# Patient Record
Sex: FEMALE | Race: BLACK OR AFRICAN AMERICAN
[De-identification: names, ages, dates, MRNs, and addresses within clinical notes are randomized per-mention and may not be internally consistent; named-entity substitution may affect disease eponyms.]

---

## 2017-04-17 NOTE — PDOC
History of Present Illness





- General


History Source: Patient


Exam Limitations: No Limitations





- History of Present Illness


Initial Comments: 


04/17/17 08:09


The patient is a 46-year-old woman, with a significant past medical history of 

gastroesophageal reflux disease who presents to the emergency department via 

walk-in for further evaluation of abdominal pain since 01:30 AM this morning. 

No fall, trauma, strenuous activity. Her last meal was at approximately 20:30 

last night. She reports experiencing sharp right upper quadrant pain that woke 

her up from her sleep. She states that her pain has been constant since onset 

and within  the past few hours, she notes that her pain has wrapped around her 

right flank. She states that her pain is exacerbated when taking deep breathes. 

She reports associated symptoms of nausea and vomiting (x1; non-bloody).  She 

does not provide any alleviating factors. She also notes that she has 

experienced urinary frequency over the past day with dark appearing urine. No 

dysuria. No fever, chills, cough, shortness of breath, chest pain, headaches, 

leg pain/swelling. 





Allergies: None Known


Past Surgical History: Caesarian Sections


Social History: Occasional tobacco smoker. Occasional ETOH use. No recreational 

drug use.


Primary Care Physician: N/A recently moved from [place], [place] (in transition of 

finding a new PCP).





<Krista Zamarripa - Last Filed: 04/17/17 11:54>





<Gianni Andre - Last Filed: 04/17/17 12:35>





- General


Chief Complaint: Pain


Stated Complaint: DIFFICULTY BREATHING, ABD PAIN


Time Seen by Provider: 04/17/17 07:41





Past History





<Krista Zamarripa - Last Filed: 04/17/17 11:54>





- Psycho/Social/Smoking Cessation Hx


Suicidal Ideation: No


Smoking History: Never smoked


Information on smoking cessation initiated: No





<Gianni Andre - Last Filed: 04/17/17 12:35>





- Past Medical History


Allergies/Adverse Reactions: 


 Allergies











Allergy/AdvReac Type Severity Reaction Status Date / Time


 


No Known Allergies Allergy   Verified 04/17/17 07:14











Home Medications: 


Ambulatory Orders





Sulfamethoxazole/Trimethoprim [Bactrim Ds -] 1 tab PO BID #14 tablet 04/17/17 











**Review of Systems





- Review of Systems


Constitutional: No: Chills, Fever


Respiratory: No: Cough, Shortness of Breath


ABD/GI: Yes: Nausea, Vomiting.  No: Constipated, Diarrhea


: Yes: Frequency.  No: Dysuria, Hematuria


All Other Systems: Reviewed and Negative





<Gianni Andre - Last Filed: 04/17/17 12:35>





*Physical Exam





- Vital Signs


 Last Vital Signs











Temp Pulse Resp BP Pulse Ox


 


 98.3 F   94 H  18   140/85   98 


 


 04/17/17 07:11  04/17/17 07:11  04/17/17 07:11  04/17/17 07:11  04/17/17 07:38














- Physical Exam


Comments: 


04/17/17 08:10


GENERAL: The patient is awake, alert, and fully oriented, in no acute distress.


HEAD: Normal with no signs of trauma.


EYES: Pupils equal, round and reactive to light, extraocular movements intact, 

sclera 


anicteric, conjunctiva clear with no pallor.


ENT: Ears normal, nares patent, oropharynx clear without exudates.  Moist 

mucous membranes.


NECK: Normal range of motion, supple without lymphadenopathy, JVD, or masses.


LUNGS: Breath sounds equal, clear to auscultation bilaterally.  No wheeze/

crackles.


HEART: Regular rate and rhythm, normal S1 and S2 without murmur or rub.


ABDOMEN: Soft. There is some right upper quadrant tenderness to palpation with 

guarding. +Gallatin sign. +Right CVA tenderness. Nondistended. BS wnl.  No 

rebound.  No palpable masses. No hepatosplenomegaly.


 EXTREMITIES: Normal range of motion, no edema.  No clubbing or cyanosis. No 

cords, erythema, or tenderness.


NEUROLOGICAL: Cranial nerves II through XII grossly intact.  Normal speech.


PSYCH: Normal mood, normal affect.


SKIN: Warm, Dry, normal turgor, no rashes or lesions noted.





<Krista Zamarripa - Last Filed: 04/17/17 11:54>





- Vital Signs


 Last Vital Signs











Temp Pulse Resp BP Pulse Ox


 


 98.3 F   94 H  18   140/85   98 


 


 04/17/17 07:11  04/17/17 07:11  04/17/17 07:11  04/17/17 07:11  04/17/17 07:38














<Gianni Andre - Last Filed: 04/17/17 12:35>





**Heart Score/ECG Review


  ** #1


ECG reviewed & interpreted by me at: 08:19


General ECG Interpretation: Sinus Rhythm, Normal Rate (72), Normal Intervals (

qtc 473), No acute ischemic changes (T wave flattening V3-V5, no ST changes)





<Gianni Andre - Last Filed: 04/17/17 12:35>





ED Treatment Course





- LABORATORY


CBC & Chemistry Diagram: 


 04/17/17 07:59





 04/17/17 07:59





- RADIOLOGY


Radiograph Interpretation: 


04/17/17 10:13


EXAM: US/ABDOMEN US -LIMITED


IMPRESSION: The liver measures 18.1 cm in craniocaudal length with a slightly 

coarse echotexture. The gallbladder is adequately distended without 

intraluminal stones or thickening of its wall. No intra or extrahepatic bile 

duct dilatation is seen. The right and left kidney measured 10 and 10.2 cm , 

respectively. Small right renal upper pole cyst measuring 7 x 6 mm with a small 

upper pole nonobstructing stone measuring 5 mm. There are also a couple of 

nonobstructing left renal stones with the largest measuring 10 mm. The spleen 

measures 9.7 cm in sagittal length with homogeneous echotexture. Visualized 

portion of the pancreas appears unremarkable. There is a trace of free fluid in 

Castellanos's pouch. Visualized portion of the proximal abdominal aorta and 

inferior vena cava are patent. Normal flow in the main portal vein.





EXAM: CT/ABDOMEN PELVIS CT W/O CONTR 


IMPRESSION: Comparison: Ultrasound earlier today. Contiguous transaxial images 

were obtained from the diaphragmatic domes and pubic symphysis without the 

administration of oral and IV contrast as a Stone study. Lung bases: Negative. 

Bone: Negative. Liver: Negative. Gallbladder: Negative. Biliary tree: Negative. 

Spleen: Negative. Pancreas: Negative. Adrenals: Negative. Kidneys: Left kidney 

shows a small stone in the lower pole and a developing staghorn calculus in the 

mid to upper pole. The largest component of the staghorn calculus measures 20 

mm in AP dimension x 9 mm in width. The right kidney shows no stones. No 

obvious hydronephrosis is seen. Mild proximal right periureteral stranding 

which could be related to inflammatory disease or previous stone disease. I do 

not see a calcified stone at this time. Pelvis: Negative. Small calcifications 

in the left pelvis could represent phleboliths and/or uterine calcification. No 

calcifications in the right hemipelvis are seen. Small right adnexal cyst. The 

bladder is incompletely distended and compressed by the uterus but no stones 

are seen. Bowel: Negative. Other: There is a small hiatal hernia. 





<ZamarripaKrista - Last Filed: 04/17/17 11:54>





- LABORATORY


CBC & Chemistry Diagram: 


 04/17/17 07:59





 04/17/17 07:59





- RADIOLOGY


Radiology Studies Ordered: 














 Category Date Time Status


 


 GALLBLADDER US [US] Stat Ultrasound  04/17/17 08:03 Ordered














<Gianni Andre - Last Filed: 04/17/17 12:35>





Medical Decision Making





- Medical Decision Making


04/17/17 08:05


A portion of this note was documented by scribe services under my direction. I 

have reviewed the details of the note, within reason, and agree with the 

documentation with the following case summary and management plan written by me.





46-year-old female with no significant past medical history presents with right 

upper quadrant pain associated with nausea/vomiting since 1:30 AM. Patient was 

in her usual state of normal health, last meal was around 8 PM last night, was 

awoken from sleep at 1:30 with sharp right upper quadrant pain radiating around 

to her right back, persistent. No fevers or chills, nonbloody emesis, no 

history of recurrent postprandial pain. Does have GERD, but this is different.


Some urinary frequency but no gross hematuria.





Only surgical history was C-sections.





Afebrile.


Tender with guarding in the right upper quadrant, also has right CVA tenderness





46-year-old female with acute onset right upper quadrant pain with nausea/

vomiting, peritoneal findings at this time in the right upper quadrant. 

Presentation seems most consistent with biliary colic, possibly renal colic. 

Low suspicion for cardiopulmonary etiology. 


labs, ua, urine preg


ekg


RUQ sono


pain and nausea control


reassess





04/17/17 09:28


White count 11.1 with normal differential, hemoglobin 9.9 without prior for 

comparison.


Chemistries are within normal limits, including LFTs and lipase.


Urinalysis notable for elevated red blood cells at 72, 1+ leuk esterase.


Labs seem more consistent with renal colic, will check noncontrast CT for size 

and location given new onset.





04/17/17 12:28


CTAP shows renal stones but no evidence of ureteral stone or obstruction. 

Slight stranding around the right kidney, question recently passed stone versus 

infection given the urinalysis. Patient is completely comfortable now, her exam 

is normal. We'll treat empirically for UTI, question in the setting of recently 

passed stone but without any evidence of obstruction. Urine culture sent, will 

give dose of ceftriaxone intravenously in the ED, then discharge on Bactrim 

with urology follow-up. Understands return criteria.





<Gianni Andre - Last Filed: 04/17/17 12:35>





*DC/Admit/Observation/Transfer





- Attestations


Scribe Attestion: 


04/17/17 08:11


Documentation prepared by Krista Zamarripa, acting as medical scribe for 

Gianni Andre MD.





<Krista Zamarripa - Last Filed: 04/17/17 11:54>





<Gianni Andre - Last Filed: 04/17/17 12:35>


Diagnosis at time of Disposition: 


 Right upper quadrant abdominal pain, Kidney stone





- Discharge Dispostion


Disposition: HOME


Condition at time of disposition: Improved





- Prescriptions


Prescriptions: 


Sulfamethoxazole/Trimethoprim [Bactrim Ds -] 1 tab PO BID #14 tablet





- Referrals


Referrals: 


Quan Dacosta MD [Staff Physician] - 


Nathan Mathis MD [Staff Physician] - 





- Patient Instructions


Printed Discharge Instructions:  DI for Urinary Tract Infection (UTI), DI for 

Kidney Stones


Additional Instructions: 


Activity as tolerated. Stay hydrated. Tylenol 1000 mg every 8 hours and/or 

ibuprofen 600 mg every 8 hours as needed for pain. 





As discussed, blood tests, a urine test, an ultrasound, and a CAT scan of your 

abdomen and pelvis showed no current kidney stones, but there are stones in the 

kidneys. Your gallbladder is normal. 


I suspect you either passed a stone or have a urine infection. Take Bactrim as 

prescribed for 7 days. The urine culture results should be available in 3-4 

days. 





Continue your medications as previously prescribed by your physician. 





You should follow up with a primary doctor and a urologist as soon as possible 

regarding today's emergency department visit. Consider calling Dr. Mathis 

for an appointment with a primary physician, and Dr. Dacosta for an appointment 

with a urologist. 





Return to the emergency department for any new or concerning symptoms, 

particularly persistent or worsening pain, fevers or chills, difficulty 

urinating, vomiting.

## 2017-04-17 NOTE — EKG
Test Reason : 

Blood Pressure : ***/*** mmHG

Vent. Rate : 072 BPM     Atrial Rate : 072 BPM

   P-R Int : 150 ms          QRS Dur : 088 ms

    QT Int : 432 ms       P-R-T Axes : 067 021 002 degrees

   QTc Int : 473 ms

 

NORMAL SINUS RHYTHM

NONSPECIFIC T WAVE ABNORMALITY

PROLONGED QT

ABNORMAL ECG

NO PREVIOUS ECGS AVAILABLE

Confirmed by ALESSIO HACKETT MD ([id number]) on 4/17/2017 3:06:40 PM

 

Referred By:             Confirmed By:ALESSIO HACKETT MD

## 2017-07-14 NOTE — OP
Operative Note





- Note:


Operative Date: 07/14/17


Pre-Operative Diagnosis: L renal calculus


Operation: L ureteropyeloscopic laser lithotripsy and L JJ stent insertion


Findings: 


L renal calculus


Post-Operative Diagnosis: Same as Pre-op


Surgeon: Quan Dacosta


Anesthesiologist/CRNA: Veronica Elizondo


Anesthesia: General


Drains & Tubes with Location: 6 fr 24 cm L JJ stent


Operative Report Dictated: Yes

## 2017-07-19 NOTE — PDOC
History of Present Illness





- History of Present Illness


Initial Comments: 


07/19/17 22:45


Patient is a 47 year old female with significant medical hx of nephrolithiasis 

who is presenting to the ED with fevers, left flank pain and suprapubic pain 

for two days. The patient underwent lithotripsy with ureteral stent placement 

on 07/14/17 by Dr. Dacosta for kidney stones. Since her procedure the patient has 

experienced fevers and pain to her left side, left flank, and suprapubic 

region. Patient also endorses some nausea but denies any vomiting or diarrhea. 

Denies hematuria.


Surgeon: Quan Dacosta MD








<Cristal De Oliveira - Last Filed: 07/20/17 00:48>





<Nnamdi Delaney - Last Filed: 07/20/17 01:15>





- General


Chief Complaint: SIRS, Suspected/Possible


Stated Complaint: FEVER


Time Seen by Provider: 07/19/17 20:28





Past History





<Cristal De Oliveira - Last Filed: 07/20/17 00:48>





- Past Medical History


Anemia: Yes


 Disorders: Yes (STONES)


HTN: Yes


Thyroid Disease: No





- Psycho/Social/Smoking Cessation Hx


Suicidal Ideation: No


Smoking History: Current every day smoker


Have you smoked in the past 12 months: Yes


Number of Cigarettes Smoked Daily: 1


Information on smoking cessation initiated: No


'Breaking Loose' booklet given: 07/14/17


Hx Alcohol Use: Yes (SOCIALLY)


Drug/Substance Use Hx: No


Substance Use Type: Alcohol





<Nnamdi Delaney - Last Filed: 07/20/17 01:15>





- Past Medical History


Allergies/Adverse Reactions: 


 Allergies











Allergy/AdvReac Type Severity Reaction Status Date / Time


 


No Known Allergies Allergy   Verified 07/19/17 20:14











Home Medications: 


Ambulatory Orders





Acetaminophen [Tylenol] 650 mg PO PRN PRN 07/11/17 


Ferrous Sulfate [Feosol] 325 mg PO HS 07/11/17 


Lisinopril [Prinivil] 20 mg PO HS 07/11/17 


Omega-3 Fatty Acids [Fish Oil] 300 mg PO DAILY 07/11/17 


Oxycodone HCl/Acetaminophen [Percocet 5-325 mg Tablet] 1 - 2 tab PO Q4H PRN #60 

tablet MDD 6 07/14/17 


Sulfamethoxazole/Trimethoprim [Bactrim Ds Tablet] 1 each PO BID #14 tablet 07/14 /17 











**Review of Systems





- Review of Systems


Comments:: 


07/19/17 22:46


CONSTITUTIONAL: Fever. No chills, no fatigue


EYES: No visual changes


ENT: No ear pain, no sore throat


CARDIOVASCULAR: No chest pain, no palpitations


RESPIRATORY: No cough, no SOB


GI: Left side pain, nausea. No vomiting, no constipation, no diarrhea


GENITOURINARY: No dysuria, no frequency, no hematuria


MUSKULOSKELETAL: Left flank pain. No joint pain, no myalgias


SKIN: No rash


NEURO: No headache








<Cristal De Oliveira - Last Filed: 07/20/17 00:48>





*Physical Exam





- Vital Signs


 Last Vital Signs











Temp Pulse Resp BP Pulse Ox


 


 99.1 F   109 H  18   140/83   95 


 


 07/19/17 20:15  07/19/17 20:15  07/19/17 20:15  07/19/17 20:15  07/19/17 20:15














- Physical Exam


Comments: 


07/19/17 22:48


CONSTITUTIONAL: Well-appearing; well-nourished; in no apparent distress


HEAD: Normocephalic; atraumatic


EYES: PERRL; EOM intact


ENMT: External appears normal; normal oropharynx


NECK: Supple; non-tender; no cervical lymphadenopathy


CARD: Normal S1, S2; no murmurs, rubs, or gallops


RESP: Normal chest excursion with respiration; breath sounds clear and equal 

bilaterally; no 


wheezes, rhonchi, or rales


ABD: Soft, non-distended; mild to moderate LLQ and LUQ tenderness; no palpable 

organomegaly, no palpable hernias


BACK: Significant left CVA tenderness.


EXT: Normal ROM in all four extremities; non-tender to palpation; distal pulses 

intact


SKIN: Warm, dry, no rash


NEURO: No focal neurological deficiencies.








<Cristal De Oliveira - Last Filed: 07/20/17 00:48>





- Vital Signs


 Last Vital Signs











Temp Pulse Resp BP Pulse Ox


 


 99.1 F   109 H  18   140/83   95 


 


 07/19/17 20:15  07/19/17 20:15  07/19/17 20:15  07/19/17 20:15  07/19/17 20:15














<Nnamdi Delaney - Last Filed: 07/20/17 01:15>





ED Treatment Course





- LABORATORY


CBC & Chemistry Diagram: 


 07/19/17 20:58





 07/19/17 20:58





- ADDITIONAL ORDERS


Additional order review: 


 Laboratory  Results











  07/19/17 07/19/17 07/19/17





  21:30 20:58 20:58


 


INR    1.04


 


Sodium   139 


 


Potassium   3.7 


 


Chloride   104 


 


Carbon Dioxide   27 


 


Anion Gap   8 


 


BUN   10 


 


Creatinine   1.0 


 


Creat Clearance w eGFR   59.69 


 


Random Glucose   101 


 


Calcium   9.0 


 


Total Bilirubin   0.2 


 


AST   14 L 


 


ALT   19 


 


Alkaline Phosphatase   107  D 


 


Total Protein   7.1 


 


Albumin   2.8 L 


 


Urine Color  Yellow  


 


Urine Appearance  Slcloudy  


 


Urine pH  6.0  


 


Urine Protein  2+ H  


 


Urine Glucose (UA)  Negative  


 


Urine Ketones  Negative  


 


Urine Blood  2+ H  


 


Urine Nitrite  Negative  


 


Urine Bilirubin  Negative  


 


Urine Urobilinogen  4.0 e.u/dl H  


 


Ur Leukocyte Esterase  3+ H D  


 


Urine RBC  850  


 


Urine WBC  33  


 


Ur Epithelial Cells  Rare  


 


Urine Mucus  Rare  


 


Urine HCG, Qual  Negative  








 











  07/19/17





  20:58


 


RBC  3.62


 


MCV  81.9


 


MCHC  31.9 L


 


RDW  19.0 H


 


MPV  7.8


 


Neutrophils %  71.1


 


Lymphocytes %  17.0  D


 


Monocytes %  10.4 H D


 


Eosinophils %  0.4  D


 


Basophils %  1.1  D














- RADIOLOGY


Radiograph Interpretation: 


07/20/17 00:48


: (sarah) 


Report Date: 07/19/2017 23:39:00 


Report Status: Preliminary 


======================= Begin of Report Content ======================  


Referring Physician: Nnamdi Delaney 


Patient Name: Arabella Lambert 


THIS IS A PRELIMINARY REPORT FROM [facility]   


EXAM: Ultrasound kidneys  


IMAGES: 32  


INDICATION: Left kidney pain. Rule out hydronephrosis.  


DATE OF SERVICE: 2017-07-19 23:39:38.0  


COMPARISON: none  


FINDINGS: The right kidney is 9.9 cm in length and appears normal. The left 

kidney is 11.2 cm in length and contains a 1.1 x 0.8 x 0.6 cm stone in the 

midpole there is no hydronephrosis. No additional stones.   


IMPRESSION: 1.1 cm nonobstructing left renal stone. 


THIS DOCUMENT HAS BEEN ELECTRONICALLY SIGNED 


Benson Wang MD 07/20/2017 00:25 EST 


M.D. Please call [facility] [phone] ([phone]) with questions.  


======================== End of Report Content ======================





- Medications


Given in the ED: 


ED Medications














Discontinued Medications














Generic Name Dose Route Start Last Admin





  Trade Name Freq  PRN Reason Stop Dose Admin


 


Sodium Chloride  1,000 mls @ 1,000 mls/hr 07/19/17 20:55 07/19/17 21:30





  Normal Saline -  IV 07/19/17 21:54  1,000 mls/hr





  ASDIR STA   Administration


 


Ceftriaxone Sodium 2 gm/  100 mls @ 200 mls/hr 07/19/17 20:57 07/19/17 21:30





  Dextrose  IVPB 07/19/17 21:26  200 mls/hr





  ONCE ONE   Administration


 


Morphine Sulfate  4 mg 07/19/17 20:55 07/19/17 21:30





  Morphine Injection -  IVPUSH 07/19/17 20:56  4 mg





  ONCE ONE   Administration


 


Ondansetron HCl  8 mg 07/19/17 20:55 07/19/17 21:30





  Zofran Injection  IVPB 07/19/17 20:56  8 mg





  ONCE ONE   Administration














<Cristal De Oliveira - Last Filed: 07/20/17 00:48>





- LABORATORY


CBC & Chemistry Diagram: 


 07/19/17 20:58





 07/19/17 20:58





<Nnamdi Delaney - Last Filed: 07/20/17 01:15>





Medical Decision Making





- Medical Decision Making





07/20/17 01:11


Patient is a 47-year-old female with history of hypertension, nephrolithiasis 

who presents to the ER with severe left flank pain, dysuria, fevers (MAXIMUM 

TEMPERATURE 102), after undergoing lithotripsy despite taking Bactrim DS 

prescribed by urologist. In the ER, patient is awake and alert, with 

significant left CVA tenderness to palpation; CBC reveals no evidence of 

significant leukocytosis or neutrophilia. CMP is within normal limit. 

Urinalysis reveals more than 33 WBCs per high-power field as well as 850 rbc's. 

Symptoms are consistent with acute pyelonephritis post lithotripsy. Blood and 

urine cultures been obtained. We'll administer IV ceftriaxone. Case discussed 

with Dr. Jimenez. He agrees with plan of admission for IV antibiotics.





<Nnamdi Delaney - Last Filed: 07/20/17 01:15>





*DC/Admit/Observation/Transfer





- Attestations


Scribe Attestion: 


07/19/17 22:50


Documentation prepared by Cristal De Oliveira, acting as medical scribe for Nnamdi Delaney MD.





<Cristal De Oliveira - Last Filed: 07/20/17 00:48>





- Discharge Dispostion


Admit: Yes





- Attestations


Physician Attestion: 


07/19/17 21:04


The documentation was prepared by the scribe under my direct supervision. I 

have reviewed the documentation which correctly represents the findings, 

medical decision-making and critical action taken by me.














<Nnamdi Delaney - Last Filed: 07/20/17 01:15>


Diagnosis at time of Disposition: 


 Pyelonephritis, Kidney stone





- Discharge Dispostion


Condition at time of disposition: Fair





- Referrals


Referrals: 


Nathan Mathis MD [Primary Care Provider] -

## 2017-07-20 NOTE — CONS
INFECTIOUS DISEASE CONSULTATION

 

DATE OF CONSULTATION:  2017

 

The patient is a 47-year-old female with a history of nephrolithiasis, evaluated for

possible sepsis secondary to urinary tract focus.

 

Patient has a history of nephrolithiasis.  She recently underwent an outpatient

lithotripsy procedure on 2017.  She underwent cystoscopy with left ureteral

stent placement.

 

She reports that since the procedure, she has been having left flank discomfort.  It

got progressively worse over the past 2 days and is associated with dysuria,

suprapubic pain, and fever to 101.5.  She had been taking Bactrim Double Strength

twice a day post procedure.

 

At the present time, she complains of dysuria.  Denies hematuria.  Cultures were

obtained, and she was empirically treated with ceftriaxone.

 

She denies recent hospitalization other than for the ureteral stent placement.  No

recent antibiotic therapy other than the recent Bactrim.  No history of resistant

urinary tract pathogens.

 

PAST MEDICAL HISTORY:  Positive for nephrolithiasis, hypertension.

 

PAST SURGICAL HISTORY:  Status post  section, knee surgery.

 

ALLERGIES:  No known allergies.

 

MEDICATIONS:  Tylenol, Flonase, Prinivil, Percocet, Bactrim.

 

SOCIAL HISTORY:  Positive for tobacco use.  Occasional EtOH.  No history of illicit

drug use.

 

SYSTEMS REVIEW:

Neurologic:  No loss of consciousness, seizure activity, focal weakness.

Cardiac:  Negative chest pain or palpitations.

Respiratory:  Negative cough or sputum production.

Gastrointestinal:  Negative vomiting or diarrhea.

Genitourinary:  As per HPI.

 

LABORATORY DATA:  White count 9.7, hematocrit 27.5, platelet count 258.  Creatinine

0.7.  Urinalysis:  White cells 33.  Blood and urine cultures pending.

 

PHYSICAL EXAMINATION:

General:  She is awake and alert.  She is in moderate distress secondary to left

flank pain.

Vital Signs:  Temperature 98.9; blood pressure 120/75; pulse 86, regular;

respirations 20 per minute.

HEENT:  Sclerae are anicteric.

Heart:  Sounds S1, S2.

Lungs:  Clear.

Abdomen:  Soft.  Positive suprapubic tenderness.  Positive left flank tenderness.

Extremities:  Negative for edema.

 

IMPRESSION:

1.  Complicated urinary tract infection, possible sepsis secondary to urinary tract

infection.

2.  Status post ureteral stent placement.

3.  Nephrolithiasis.

 

Await blood and urine culture results.  Urology evaluation.  Empiric antibiotic

coverage pending sepsis workup with cefepime 1 g IV piggyback every 8 hours, IV fluid

hydration, analgesics.

 

Thank you for the kind referral.

 

 

PREMA KUHN M.D.

 

[id number]

DD: 2017 12:30

DT: 2017 16:19

Job #:  [id number]

## 2017-07-20 NOTE — HP
CHIEF COMPLAINT: LLQ/ left flank pain





PCP: Dr. Mathis


Urologist: Dr. Quan Dacosta





HISTORY OF PRESENT ILLNESS:


48yo woman with well-controlled HTN and nephrolithiasis s/p L lithotripsy and 

stent placement 5 days ago on Bactrim (7/14) who presents with fever/chills and 

LLQ/ left flank pain for the past 5days. She describes the pain as burning with 

10/10 severity that radiates to her back. She has had fevers with a Tmax of 

101.5 for which she has been taking Acetominophen. She has had decreased 

appetite and ongoing nausea with 1x vomit (non-bilious, nonbloody). She 

endorses dysuria, but denies hematuria except on POD1. No BM's since the 

procedure, but she has been passing gas.





No SOB, chest pain, pressure, or tightness. No HA, changes in vision, or 

myalgia.





ER course was notable for:


(1) Afebrile, tachycardia 


(2) Received one dose Ceftriaxone 2gm IV


(3) UA revealed hematuria, pyuria, and 3+ LE





Recent Travel: no





PAST MEDICAL HISTORY:


#Nephrolithiasis: spontaneously passed stone in April 2017, 


#HTN - on lisinopril


#GERD








PAST SURGICAL HISTORY:


#L L ureteropyeloscopic laser lithotripsy and L JJ stent insertion


#C-sections x3 (30y ago)


#Knee surgery (32y ago)





Social History:


Smoking: denies


Alcohol: occasional (3 drinks/mo)


Drugs: denies





Family History: +HTN, DM, No family h/o nephrolithiasis,  


Allergies: NKDA





HOME MEDICATIONS:


 Home Medications











 Medication  Instructions  Recorded


 


Acetaminophen [Tylenol] 650 mg PO PRN PRN 07/11/17


 


Ferrous Sulfate [Feosol] 325 mg PO HS 07/11/17


 


Lisinopril [Prinivil] 20 mg PO HS 07/11/17


 


Omega-3 Fatty Acids [Fish Oil] 300 mg PO DAILY 07/11/17


 


Oxycodone HCl/Acetaminophen 1 - 2 tab PO Q4H PRN #60 tablet 07/14/17





[Percocet 5-325 mg Tablet] MDD 6 


 


Sulfamethoxazole/Trimethoprim 1 each PO BID #14 tablet 07/14/17





[Bactrim Ds Tablet]  








REVIEW OF SYSTEMS


CONSTITUTIONAL: +fever, chills, loss of appetite


Absent:  diaphoresis, generalized weakness, malaise, weight change


HEENT: Absent:  rhinorrhea, nasal congestion, throat pain, throat swelling, 

difficulty swallowing, mouth swelling, ear pain, eye pain, visual changes


CARDIOVASCULAR: Absent: chest pain, syncope, palpitations, irregular heart rate

, lightheadedness, peripheral edema


RESPIRATORY: Absent: cough, shortness of breath, dyspnea with exertion, 

orthopnea, wheezing, stridor, hemoptysis


GASTROINTESTINAL: +abdominal pain, nausea, vomiting, constipation


Absent:diarrhea, melena, hematochezia


GENITOURINARY: 


Absent: dysuria, frequency, urgency, hesitancy, hematuria, flank pain, genital 

pain


MUSCULOSKELETAL: 


Absent: myalgia, arthralgia, joint swelling, back pain, neck pain


SKIN: 


Absent: rash, itching, pallor


HEMATOLOGIC/IMMUNOLOGIC: 


Absent: easy bleeding, easy bruising, lymphadenopathy, frequent infections


ENDOCRINE:


Absent: unexplained weight gain, unexplained weight loss, heat intolerance, 

cold intolerance


NEUROLOGIC: 


Absent: headache, focal weakness or paresthesias, dizziness, unsteady gait, 

seizure, mental status changes, bladder or bowel incontinence


PSYCHIATRIC: 


Absent: anxiety, depression, suicidal or homicidal ideation, hallucinations.








PHYSICAL EXAMINATION





GENERAL: Awake, alert, and fully oriented, in no acute distress.


HEAD: Normal with no signs of trauma.


EYES: PERRLA, EOMI, sclera anicteric, conjunctiva clear


EARS, NOSE, THROAT: Oropharynx clear without exudates. Moist mucous membranes.


NECK: supple, no cervical LAD


LUNGS: CTAB, no wheezes, and no crackles


HEART: Regular rate and rhythm, normal S1 and S2 without murmur, rub or gallop.


ABDOMEN: moderately distended, LLQ tenderness to palpation, normoactive bowel 

sounds, no hepatomegaly or  splenomegaly


: L CVA tenderness, suprapubic tenderness


LOWER EXTREMITIES: 2+ pulses, warm, well-perfused. No peripheral edema


NEUROLOGICAL: Grossly intact, but not formally tested


PSYCHIATRIC: Cooperative. Good eye contact. Appropriate mood and affect.


SKIN: Warm, dry, normal turgor, no rashes or lesions noted





 Lab Results











WBC  11.0 K/mm3 (4.0-10.0)  H  07/19/17  20:58    


 


RBC  3.62 M/mm3 (3.60-5.2)   07/19/17  20:58    


 


Hgb  9.5 GM/dL (10.7-15.3)  L  07/19/17  20:58    


 


Hct  29.7 % (32.4-45.2)  L  07/19/17  20:58    


 


MCV  81.9 fl (80-96)   07/19/17  20:58    


 


MCHC  31.9 g/dl (32.0-36.0)  L  07/19/17  20:58    


 


RDW  19.0 % (11.6-15.6)  H  07/19/17  20:58    


 


Plt Count  323 K/MM3 (134-434)   07/19/17  20:58    


 


Sodium  139 mmol/L (136-145)   07/19/17  20:58    


 


Potassium  3.7 mmol/L (3.5-5.1)   07/19/17  20:58    


 


Chloride  104 mmol/L ()   07/19/17  20:58    


 


Carbon Dioxide  27 mmol/L (21-32)   07/19/17  20:58    


 


Anion Gap  8  (8-16)   07/19/17  20:58    


 


BUN  10 mg/dL (7-18)   07/19/17  20:58    


 


Creatinine  1.0 mg/dL (0.55-1.02)   07/19/17  20:58    


 


Random Glucose  101 mg/dL ()   07/19/17  20:58    


 


Calcium  9.0 mg/dL (8.5-10.1)   07/19/17  20:58    


 


INR  1.04  (0.82-1.09)   07/19/17  20:58    








 Hepatic Panel











Total Bilirubin  0.2 mg/dL (0.2-1.0)   07/19/17  20:58    


 


AST  14 U/L (15-37)  L  07/19/17  20:58    


 


ALT  19 U/L (12-78)   07/19/17  20:58    


 


Alkaline Phosphatase  107 U/L ()  D 07/19/17  20:58    


 


Albumin  2.8 g/dl (3.4-5.0)  L  07/19/17  20:58    











 Urine Test Results











Urine Color  Yellow   07/19/17  21:30    


 


Urine Appearance  Slcloudy   07/19/17  21:30    


 


Urine pH  6.0  (5.0-8.0)   07/19/17  21:30    


 


Ur Specific Gravity  >= 1.030  (1.005-1.025)  H  07/19/17  21:30    


 


Urine Protein  2+  (NEGATIVE)  H  07/19/17  21:30    


 


Urine Glucose (UA)  Negative  (NEGATIVE)   07/19/17  21:30    


 


Urine Ketones  Negative  (NEGATIVE)   07/19/17  21:30    


 


Urine Blood  2+  (NEGATIVE)  H  07/19/17  21:30    


 


Urine Nitrite  Negative  (NEGATIVE)   07/19/17  21:30    


 


Urine Bilirubin  Negative  (NEGATIVE)   07/19/17  21:30    


 


Ur Leukocyte Esterase  3+  (NEGATIVE)  H D 07/19/17  21:30    


 


Urine RBC  850 /hpf (0-3)   07/19/17  21:30    


 


Urine WBC  33 /hpf (3-5)   07/19/17  21:30    


 


Ur Epithelial Cells  Rare /hpf (FEW)   07/19/17  21:30    


 


Urine Mucus  Rare   07/19/17  21:30    








IMAGING:





Renal Ultrasound 07-:


FINDINGS: The right kidney is 9.9 cm in length and appears normal. The left 

kidney is 11.2 cm in length and contains a 1.1 x 0.8 x 0.6 cm stone in the 

midpole there is no hydronephrosis. No additional stones.   


IMPRESSION: 1.1 cm nonobstructing left renal stone. 











ASSESSMENT/PLAN:


48yo woman with h/o HTN and nephrolithiasis who is POD5 from L lithotripsy and 

stent placement on Bactrim who presents fever/nausea/L flank pain that is c/w 

pyelonephritis vs UTI, and will be admitted for further monitoring and 

management. Her labs are notable for mild leukocytosis (WBC 11K), pyuria and 

hematuria with 3+LE. 





#Left flank/LLQ pain


-Start Ceftriaxone 2gm IVPB q24h


-U Cx pending: Refine antibiotics accordingly


-Morphine 2gm IVPUSH Q4H PRN for pain management


-Acetominophen 650mg PO Q4H PRN for pain/fever


-Ondansetron 8mg IVPUSH q6hr for nausea


-Urologist consulted (Dr. Quan Dacosta)





#HTN


-Continue home Lisinopril 20mg PO HS





#Anemia


-Continue home ferrous sulfate 325mg PO





#F/E/N


-NS @ 100cc/hr


-Electrolytes wnl


-NPO, small sips for PO meds





#DVT prophylaxis


-Heparin 5000U SQ Q8H





#Dispo


-Admit to Med/Surg


-FULL code





d/w team.





MARLENA ESPAÑA MD


PGY-1

















Visit type





- Emergency Visit


Emergency Visit: Yes


ED Registration Date: 07/20/17


Care time: The patient presented to the Emergency Department on the above date 

and was hospitalized for further evaluation of their emergent condition.





- New Patient


This patient is new to me today: Yes


Date on this admission: 07/20/17





- Critical Care


Critical Care patient: No

## 2017-07-20 NOTE — PN
<Jessica Holley - Last Filed: 07/20/17 16:13>


Physical Exam: 


SUBJECTIVE: Patient seen and examined this AM. Patient was writhing in the bed 

with mild discomfort. Patient states the pain started right after surgery, 

unchanged. Pt had 2 episodes of fevers and chills which brought her into the 

hospital. No other complaints of SOB, CP, fevers, chills this AM. 








OBJECTIVE:





 Vital Signs











 Period  Temp  Pulse  Resp  BP Sys/Dodd  Pulse Ox


 


 Last 24 Hr  98.2 F-99.1 F  83-97  16-20  120-135/62-83  100











GENERAL: The patient is awake, alert, and fully oriented, in moderate distress 

due to pain


HEENT: PERRLA, ECOMi, Pt wears glasses, conjunctiva clear, neck supple without 

LAD


LUNGS: CTABL, no wheezes, no crackles, no accessory muscle use


HEART: RRR. S1, S2, no murmurs, no rubs, no gallops


ABDOMEN: Soft, nondistended, normoactive bowel sounds. TTP in suprapubic region 

+ L CVA tenderness, no CVA tenderness on R, all other regions of abdomen are non

-tender


EXTREMITIES: 2+ pulses, warm, well-perfused, no edema


NEUROLOGICAL: A full neurological exam was performed. Patient is oriented x3. 

Cranial nerves 2-12 were grossly intact. Sensation was equal and intact in face 

and body bilaterally. Muscles were 5/5 in all extremities. Reflexes 2+. Normal 

speech, gait not observed. 








 Laboratory Results - last 24 hr











  07/20/17 07/20/17 07/20/17





  06:30 06:30 06:30


 


WBC  9.7  


 


RBC  3.29 L  


 


Hgb  8.8 L  


 


Hct  27.5 L  


 


MCV  83.7  


 


MCH  26.9  


 


MCHC  32.1  


 


RDW  19.5 H  


 


Plt Count  258  D  


 


MPV  7.9  


 


INR   0.98 


 


Sodium    138


 


Potassium    3.7


 


Chloride    105


 


Carbon Dioxide    24


 


Anion Gap    9


 


BUN    7  D


 


Creatinine    0.7  D


 


Random Glucose    99


 


Calcium    7.9 L








Active Medications











Generic Name Dose Route Start Last Admin





  Trade Name Freq  PRN Reason Stop Dose Admin


 


Acetaminophen  650 mg 07/20/17 05:06  





  Tylenol -  PO   





  Q4H PRN   





  PAIN   


 


Ferrous Sulfate  325 mg 07/20/17 22:00  





  Feosol -  PO   





  HS NEVAEH   


 


Heparin Sodium (Porcine)  5,000 unit 07/20/17 03:30 07/20/17 12:57





  Heparin -  SQ   Not Given





  Q8H-IV NEVAEH   


 


Sodium Chloride  1,000 mls @ 100 mls/hr 07/20/17 03:30 07/20/17 05:00





  Normal Saline -  IV   100 mls/hr





  ASDIR NEVAEH   Administration


 


Cefepime HCl 1 gm/ Dextrose  100 mls @ 200 mls/hr 07/20/17 18:00  





  IVPB   





  Q8H-IV NEVAEH   


 


Lisinopril  20 mg 07/20/17 22:00  





  Prinivil  PO   





  HS NEVAEH   


 


Morphine Sulfate  2 mg 07/20/17 03:21 07/20/17 12:56





  Morphine Injection -  IVPUSH   2 mg





  Q4H PRN   Administration





  PAIN   


 


Ondansetron HCl  8 mg 07/20/17 03:21 07/20/17 08:00





  Zofran Injection  IVPB   8 mg





  Q6H PRN   Administration





  NAUSEA   











IMAGING:


Renal Ultrasound 07-: Right kidney appears normal. The left kidney 

contains a 1.1 cm nonobstructing stone in the midpole without hydronephrosis.





ASSESSMENT/PLAN:


Pt is a 46yo F with h/o HTN and nephrolithiasis who is POD#5 from L 

ureteroscopic laser lithotripsy and JJ stent placement on Bactrim who presents 

fever/nausea/L flank pain that is c/w pyelonephritis vs UTI, and will be 

admitted for further monitoring and management. Her labs are notable for mild 

leukocytosis (WBC 11K), pyuria and hematuria with 3+ leuk esterase. 





# UTI vs L pyelonephritis


- JJ sent removed by Dr. Quan Dacosta - (patient's urologist) at bedside 


- Patient had been takiing Bactrim postop, switched to Cefepime 1g Q8 IV as per 

ID recommendations


- Urine cx are pending, will narrow spectrum when S/S is available


- Pain control w/ Morphine 2mg IV push Q4PRN + Acetaminophen 650mg PO Q4 PRN


- Ondansetron 8mg IVPUSH q6hr for nausea





#HTN - well controlled


- SBP 120s - 130s, well controlled


- Continue home Lisinopril 20mg PO HS





#Anemia


- Asymptomatic at this moment


- Will continue home ferrous sulfate 325mg PO





#FEN


- Fluids: IV NS at 100cc/hr


- Electrolytes: BMP shows low Ca2+ 7.9, will continue to monitor, replete if 

necessary


- Diet: Switched from NPO --> low sodium diet by Dr. Dacosta





# Prophylaxis


- DVT: Heparin SQ Q8


- GI: Not indicated


- Deconditioning: Only limitation to movement is pain, encouraged ambulation 

when pain resolves, no physical therapy needed





# Code Status


- Full Code








Visit type





- Emergency Visit


Emergency Visit: No





- New Patient


This patient is new to me today: No





- Critical Care


Critical Care patient: No





- Discharge Referral


Referred to [facility] No





<JessAntoniocharline - Last Filed: 07/21/17 08:01>


Physical Exam: 


SUBJECTIVE: Patient seen and examined








OBJECTIVE:





 Vital Signs











 Period  Temp  Pulse  Resp  BP Sys/Dodd  Pulse Ox


 


 Last 24 Hr  98.4 F-99.2 F  81-92  18-20  124-136/77-85  











GENERAL: The patient is awake, alert, and fully oriented, in no acute distress.


HEAD: Normal with no signs of trauma.


EYES: PERRL, extraocular movements intact, sclera anicteric, conjunctiva clear. 

No ptosis. 


ENT: Ears normal, nares patent, oropharynx clear without exudates, moist mucous 


membranes.


NECK: Trachea midline, full range of motion, supple. 


LUNGS: Breath sounds equal, clear to auscultation bilaterally, no wheezes, no 

crackles, no 


accessory muscle use. 


HEART: Regular rate and rhythm, S1, S2 without murmur, rub or gallop.


ABDOMEN: Soft, nontender, nondistended, normoactive bowel sounds, no guarding, 

no 


rebound, no hepatosplenomegaly, no masses.


EXTREMITIES: 2+ pulses, warm, well-perfused, no edema. 


NEUROLOGICAL: Cranial nerves II through XII grossly intact. Normal speech, gait 

not 


observed.


PSYCH: Normal mood, normal affect.


SKIN: Warm, dry, normal turgor, no rashes or lesions noted














 Laboratory Results - last 24 hr











  07/20/17 07/20/17 07/20/17





  06:30 06:30 06:30


 


WBC  9.7  


 


RBC  3.29 L  


 


Hgb  8.8 L  


 


Hct  27.5 L  


 


MCV  83.7  


 


MCH  26.9  


 


MCHC  32.1  


 


RDW  19.5 H  


 


Plt Count  258  D  


 


MPV  7.9  


 


INR   0.98 


 


Sodium    138


 


Potassium    3.7


 


Chloride    105


 


Carbon Dioxide    24


 


Anion Gap    9


 


BUN    7  D


 


Creatinine    0.7  D


 


Random Glucose    99


 


Calcium    7.9 L








Active Medications











Generic Name Dose Route Start Last Admin





  Trade Name Naveenq  PRN Reason Stop Dose Admin


 


Acetaminophen  650 mg 07/20/17 05:06 07/20/17 21:56





  Tylenol -  PO   650 mg





  Q4H PRN   Administration





  PAIN   


 


Ferrous Sulfate  325 mg 07/20/17 22:00 07/20/17 21:42





  Feosol -  PO   325 mg





  HS NEVAEH   Administration


 


Heparin Sodium (Porcine)  5,000 unit 07/20/17 03:30 07/21/17 01:49





  Heparin -  SQ   Not Given





  Q8H-IV NVEAEH   


 


Sodium Chloride  1,000 mls @ 100 mls/hr 07/20/17 03:30 07/21/17 01:54





  Normal Saline -  IV   100 mls/hr





  ASDIR NEVAEH   Administration


 


Cefepime HCl 1 gm/ Dextrose  100 mls @ 200 mls/hr 07/20/17 18:00 07/21/17 01:27





  IVPB   200 mls/hr





  Q8H-IV NEVAEH   Administration


 


Lisinopril  20 mg 07/20/17 22:00 07/20/17 21:42





  Prinivil  PO   20 mg





  HS NEVAEH   Administration


 


Morphine Sulfate  2 mg 07/20/17 03:21 07/21/17 01:53





  Morphine Injection -  IVPUSH   2 mg





  Q4H PRN   Administration





  PAIN   


 


Ondansetron HCl  8 mg 07/20/17 03:21 07/20/17 08:00





  Zofran Injection  IVPB   8 mg





  Q6H PRN   Administration





  NAUSEA   











ASSESSMENT/PLAN:


Patient was on Bactrim at home, was on Rocephin Iv 2gm on admission and was 

changed to Cefepime as per ID.

## 2017-07-20 NOTE — CON.GU
Consult


Consult Specialty:: Urology


Referred by:: Jess


Reason for Consultation:: fever s/p lithotripsy





- History of Present Illness


Chief Complaint: fever


History of Present Illness: 


48 yo f s/p LULL and JJ stent insertion 7/14 who pres to ED c/o fever to 102, 

abd and L flank pain despite bactrim and percocet. She was found to have WBC 11 

k and was adm for iv abxs. U/S revealed L renal calculus 1.1 cm w/o hydro and 

 cons req.





- History Source


History Provided By: Patient, Medical Record


Limitations to Obtaining History: No Limitations





- Past Medical History


...LMP: 07/08/17





- Alcohol/Substance Use


Hx Alcohol Use: Yes (SOCIALLY)





- Smoking History


Smoking history: Current every day smoker


Have you smoked in the past 12 months: Yes


Aproximately how many cigarettes per day: 1





Home Medications





- Allergies


Allergies/Adverse Reactions: 


 Allergies











Allergy/AdvReac Type Severity Reaction Status Date / Time


 


No Known Allergies Allergy   Verified 07/19/17 20:14














- Home Medications


Home Medications: 


Ambulatory Orders





Acetaminophen [Tylenol] 650 mg PO PRN PRN 07/11/17 


Ferrous Sulfate [Feosol] 325 mg PO HS 07/11/17 


Lisinopril [Prinivil] 20 mg PO HS 07/11/17 


Omega-3 Fatty Acids [Fish Oil] 300 mg PO DAILY 07/11/17 


Oxycodone HCl/Acetaminophen [Percocet 5-325 mg Tablet] 1 - 2 tab PO Q4H PRN #60 

tablet MDD 6 07/14/17 


Sulfamethoxazole/Trimethoprim [Bactrim Ds Tablet] 1 each PO BID #14 tablet 07/14 /17 











Review of Systems





- Review of Systems


Constitutional: reports: Chills, Fever


Gastrointestinal: reports: Abdominal Pain


Genitourinary: reports: Flank Pain





Physical Exam-


Vital Signs: 


 Vital Signs











Temperature  98.9 F   07/20/17 06:40


 


Pulse Rate  86   07/20/17 06:40


 


Respiratory Rate  20   07/20/17 06:40


 


Blood Pressure  120/75   07/20/17 06:40


 


O2 Sat by Pulse Oximetry (%)  100   07/20/17 01:40











Constitutional: Yes: Well Nourished, No Distress, Calm


Gastrointestinal: Yes: Normal Bowel Sounds, Soft


Renal/: Yes: CVA Tenderness - Left


Labs: 


 CBC, BMP





 07/20/17 06:30 





 07/20/17 06:30 











Imaging





- Results


Ultrasound: Report Reviewed





Assessment/Plan


Imp: UTI vs L pyelonephritis s/p L ureteroscopic laser lithotripsy and JJ stent 

insertion, resolved leukocytosis.


Rec: cont ceftriaxone and analgesics prn, await urine c+s, L JJ stent removed.

## 2017-07-20 NOTE — PN
Teaching Attending Note


Name of Resident: Luz Abel


ATTENDING PHYSICIAN STATEMENT





I saw and evaluated the patient.


I reviewed the resident's note and discussed the case with the resident.


I agree with the resident's findings and plan as documented.








SUBJECTIVE: 47 year old female presents c/o left flank pain 7/10 in intensity 

associated with fever/chills. She underwent cystoscopy/litho and stent 

placement 5 days ago . 





PAST MEDICAL HISTORY:


#Nephrolithiasis: spontaneously passed stone in April 2017, 


#HTN - on lisinopril


#GERD








PAST SURGICAL HISTORY:


#L L ureteropyeloscopic laser lithotripsy and L JJ stent insertion


#C-sections x3 (30y ago)


#Knee surgery (32y ago)





Social History:


Smoking: denies


Alcohol: occasional (3 drinks/mo)


Drugs: denies





Family History: +HTN, DM, No family h/o nephrolithiasis,  


Allergies: NKDA








OBJECTIVE:


 Vital Signs











Temperature  99.1 F   07/20/17 03:42


 


Pulse Rate  97 H  07/20/17 03:42


 


Respiratory Rate  20   07/20/17 03:42


 


Blood Pressure  120/70   07/20/17 03:42


 


O2 Sat by Pulse Oximetry (%)  100   07/20/17 01:40








EARS, NOSE, THROAT: Oropharynx clear without exudates. Moist mucous membranes.


NECK: supple, no cervical LAD


LUNGS: CTAB, no wheezes, and no crackles


HEART: Regular rate and rhythm, normal S1 and S2 without murmur, rub or gallop.


ABDOMEN: moderately distended, LLQ tenderness to palpation, normoactive bowel 

sounds, no hepatomegaly or  splenomegaly


: L CVA tenderness, suprapubic tenderness 





 07/19/17 20:58 





 07/19/17 20:58 














ASSESSMENT AND PLAN:


47 year old female with history of nephrolithiasis and recent cystoscopy / 

stent placement that presents with acute pyelonephritis despite treatment with 

ORAL Bactrim 


- admit as an inpatient 


- IV rocephine 


- blood and urine culture 


- urology consultation 


- c/w rest of the home meds

## 2017-07-20 NOTE — PN
Teaching Attending Note


Name of Resident: Jessica Holley


ATTENDING PHYSICIAN STATEMENT





I saw and evaluated the patient.


I reviewed the resident's note and discussed the case with the resident.


I agree with the resident's findings and plan as documented.








SUBJECTIVE:


Patient stated that her pain continues, no change from last night.





OBJECTIVE:


 Vital Signs











Temperature  98.4 F   07/20/17 14:43


 


Pulse Rate  83   07/20/17 14:43


 


Respiratory Rate  18   07/20/17 14:43


 


Blood Pressure  132/83   07/20/17 14:43


 


O2 Sat by Pulse Oximetry (%)  100   07/20/17 01:40








 CBCD











WBC  9.7 K/mm3 (4.0-10.0)   07/20/17  06:30    


 


RBC  3.29 M/mm3 (3.60-5.2)  L  07/20/17  06:30    


 


Hgb  8.8 GM/dL (10.7-15.3)  L  07/20/17  06:30    


 


Hct  27.5 % (32.4-45.2)  L  07/20/17  06:30    


 


MCV  83.7 fl (80-96)   07/20/17  06:30    


 


MCHC  32.1 g/dl (32.0-36.0)   07/20/17  06:30    


 


RDW  19.5 % (11.6-15.6)  H  07/20/17  06:30    


 


Plt Count  258 K/MM3 (134-434)  D 07/20/17  06:30    


 


MPV  7.9 fl (7.5-11.1)   07/20/17  06:30    








 CMP











Sodium  138 mmol/L (136-145)   07/20/17  06:30    


 


Potassium  3.7 mmol/L (3.5-5.1)   07/20/17  06:30    


 


Chloride  105 mmol/L ()   07/20/17  06:30    


 


Carbon Dioxide  24 mmol/L (21-32)   07/20/17  06:30    


 


Anion Gap  9  (8-16)   07/20/17  06:30    


 


BUN  7 mg/dL (7-18)  D 07/20/17  06:30    


 


Creatinine  0.7 mg/dL (0.55-1.02)  D 07/20/17  06:30    


 


Creat Clearance w eGFR  59.69  (>60)   07/19/17  20:58    


 


Random Glucose  99 mg/dL ()   07/20/17  06:30    


 


Calcium  7.9 mg/dL (8.5-10.1)  L  07/20/17  06:30    


 


Total Bilirubin  0.2 mg/dL (0.2-1.0)   07/19/17  20:58    


 


AST  14 U/L (15-37)  L  07/19/17  20:58    


 


ALT  19 U/L (12-78)   07/19/17  20:58    


 


Alkaline Phosphatase  107 U/L ()  D 07/19/17  20:58    


 


Total Protein  7.1 g/dl (6.4-8.2)   07/19/17  20:58    


 


Albumin  2.8 g/dl (3.4-5.0)  L  07/19/17  20:58    








 Home Medications











 Medication  Instructions  Recorded


 


Acetaminophen [Tylenol] 650 mg PO PRN PRN 07/11/17


 


Ferrous Sulfate [Feosol] 325 mg PO HS 07/11/17


 


Lisinopril [Prinivil] 20 mg PO HS 07/11/17


 


Omega-3 Fatty Acids [Fish Oil] 300 mg PO DAILY 07/11/17


 


Oxycodone HCl/Acetaminophen 1 - 2 tab PO Q4H PRN #60 tablet 07/14/17





[Percocet 5-325 mg Tablet] MDD 6 


 


Sulfamethoxazole/Trimethoprim 1 each PO BID #14 tablet 07/14/17





[Bactrim Ds Tablet]  








 Current Medications











Generic Name Dose Route Start Last Admin





  Trade Name Freq  PRN Reason Stop Dose Admin


 


Acetaminophen  650 mg 07/20/17 05:06  





  Tylenol -  PO   





  Q4H PRN   





  PAIN   


 


Ferrous Sulfate  325 mg 07/20/17 22:00  





  Feosol -  PO   





  HS NEVAEH   


 


Heparin Sodium (Porcine)  5,000 unit 07/20/17 03:30 07/20/17 12:57





  Heparin -  SQ   Not Given





  Q8H-IV NEVAEH   


 


Sodium Chloride  1,000 mls @ 100 mls/hr 07/20/17 03:30 07/20/17 05:00





  Normal Saline -  IV   100 mls/hr





  ASDIR NEVAEH   Administration


 


Cefepime HCl 1 gm/ Dextrose  100 mls @ 200 mls/hr 07/20/17 18:00  





  IVPB   





  Q8H-IV NEVAEH   


 


Lisinopril  20 mg 07/20/17 22:00  





  Prinivil  PO   





  HS NEVAEH   


 


Morphine Sulfate  2 mg 07/20/17 03:21 07/20/17 12:56





  Morphine Injection -  IVPUSH   2 mg





  Q4H PRN   Administration





  PAIN   


 


Ondansetron HCl  8 mg 07/20/17 03:21 07/20/17 08:00





  Zofran Injection  IVPB   8 mg





  Q6H PRN   Administration





  NAUSEA   

















 Urine Test Results











Urine Color  Yellow   07/19/17  21:30    


 


Urine Appearance  Slcloudy   07/19/17  21:30    


 


Urine pH  6.0  (5.0-8.0)   07/19/17  21:30    


 


Ur Specific Gravity  >= 1.030  (1.005-1.025)  H  07/19/17  21:30    


 


Urine Protein  2+  (NEGATIVE)  H  07/19/17  21:30    


 


Urine Glucose (UA)  Negative  (NEGATIVE)   07/19/17  21:30    


 


Urine Ketones  Negative  (NEGATIVE)   07/19/17  21:30    


 


Urine Blood  2+  (NEGATIVE)  H  07/19/17  21:30    


 


Urine Nitrite  Negative  (NEGATIVE)   07/19/17  21:30    


 


Urine Bilirubin  Negative  (NEGATIVE)   07/19/17  21:30    


 


Ur Leukocyte Esterase  3+  (NEGATIVE)  H D 07/19/17  21:30    


 


Urine RBC  850 /hpf (0-3)   07/19/17  21:30    


 


Urine WBC  33 /hpf (3-5)   07/19/17  21:30    


 


Ur Epithelial Cells  Rare /hpf (FEW)   07/19/17  21:30    


 


Urine Mucus  Rare   07/19/17  21:30    




















PE: per resident's note


No CVA tenderness.





Renal Ultrasound 07-: Right kidney appears normal. The left kidney 

contains a 1.1 cm nonobstructing stone in the midpole without hydronephrosis.





ASSESSMENT/PLAN:


Pt is a 48yo F with h/o HTN and nephrolithiasis who is POD#5 from L 

ureteroscopic laser lithotripsy and JJ stent placement AND WAS placed on  PO 

Bactrim and was admitted for UTI/Pylonephritis 





# Acute  UTI vs Left  pyelonephritis s/p  JJ stent placement , patient was 

started on IV antibiotic Rocephin 2gm , ID consulted to evaluate the patient 

further Rocephin IV was changed to Cefepime as per ID. s/p  removal of JJ stent 

as per by Dr. Quan Dacosta - (patient's urologist) at bedside.


urine culture is pending.





#HTN - well controlled continue home meds





#Anemia asymptomatic at this moment, on  home ferrous sulfate 325mg PO continue





 DVT: Heparin SQ Q8


Code Status :  Full Code

## 2017-07-20 NOTE — HP
HISTORY OF PRESENT ILLNESS:


Patient is a 47 year old female with a PMHx of nephrolithiasis and HTN who 

presented for severe left flank, LLQ, LUQ and suprapubic pain associated with 

fevers, chills, nausea and nonbloody vomiting x1 for the last 5 days that 

progressively worsened.  Patient is S/P lithotripsy with stent placement on 07/ 14/17 and reports since then she  experienced fevers of Tmax 102.0 F, which 

prompted this hospital visit. Patient is currently on Bactrim and Tylenol PRN. 

Patient otherwise denies chest pain, palpitations, shortness of breath, 

hematuria, frequency.  








REVIEW OF SYSTEMS


(+) Fevers, Abdominal pain, nausea


(-) Hematuria, frequency 








PHYSICAL EXAMINATION


 Vital Signs - 24 hr











  07/19/17





  20:15


 


Temperature 99.1 F


 


Pulse Rate 109 H


 


Respiratory 18





Rate 


 


Blood Pressure 140/83


 


O2 Sat by Pulse 95





Oximetry (%) 











GENERAL: Awake, alert, and fully oriented, in no acute distress.


LUNGS: Breath sounds equal, clear to auscultation bilaterally. No wheezes, and 

no crackles. No accessory muscle use.


HEART: Regular rate and rhythm, normal S1 and S2 without murmur, rub or gallop.


ABDOMEN: Soft, moderate tenderness upon palpation of left flank, LLQ, LUQ and 

suprapubic region, distended. (+) Left CVA tenderness. Normoactive bowel sounds

, no guarding, no rebound, no masses. 


LOWER EXTREMITIES: No peripheral edema. 


            


            Laboratory Results - last 24 hr











  07/19/17 07/19/17 07/19/17





  20:58 20:58 20:58


 


WBC  11.0 H  


 


RBC  3.62  


 


Hgb  9.5 L  


 


Hct  29.7 L  


 


MCV  81.9  


 


MCH  26.1  


 


MCHC  31.9 L  


 


RDW  19.0 H  


 


Plt Count  323  


 


MPV  7.8  


 


Neutrophils %  71.1  


 


Lymphocytes %  17.0  D  


 


Monocytes %  10.4 H D  


 


Eosinophils %  0.4  D  


 


Basophils %  1.1  D  


 


INR   1.04 


 


Sodium    139


 


Potassium    3.7


 


Chloride    104


 


Carbon Dioxide    27


 


Anion Gap    8


 


BUN    10


 


Creatinine    1.0


 


Creat Clearance w eGFR    59.69


 


Random Glucose    101


 


Calcium    9.0


 


Total Bilirubin    0.2


 


AST    14 L


 


ALT    19


 


Alkaline Phosphatase    107  D


 


Total Protein    7.1


 


Albumin    2.8 L


 


Urine Color   


 


Urine Appearance   


 


Urine pH   


 


Ur Specific Gravity   


 


Urine Protein   


 


Urine Glucose (UA)   


 


Urine Ketones   


 


Urine Blood   


 


Urine Nitrite   


 


Urine Bilirubin   


 


Urine Urobilinogen   


 


Ur Leukocyte Esterase   


 


Urine RBC   


 


Urine WBC   


 


Ur Epithelial Cells   


 


Urine Mucus   


 


Urine HCG, Qual   














  07/19/17





  21:30


 


WBC 


 


RBC 


 


Hgb 


 


Hct 


 


MCV 


 


MCH 


 


MCHC 


 


RDW 


 


Plt Count 


 


MPV 


 


Neutrophils % 


 


Lymphocytes % 


 


Monocytes % 


 


Eosinophils % 


 


Basophils % 


 


INR 


 


Sodium 


 


Potassium 


 


Chloride 


 


Carbon Dioxide 


 


Anion Gap 


 


BUN 


 


Creatinine 


 


Creat Clearance w eGFR 


 


Random Glucose 


 


Calcium 


 


Total Bilirubin 


 


AST 


 


ALT 


 


Alkaline Phosphatase 


 


Total Protein 


 


Albumin 


 


Urine Color  Yellow


 


Urine Appearance  Slcloudy


 


Urine pH  6.0


 


Ur Specific Gravity  >= 1.030 H


 


Urine Protein  2+ H


 


Urine Glucose (UA)  Negative


 


Urine Ketones  Negative


 


Urine Blood  2+ H


 


Urine Nitrite  Negative


 


Urine Bilirubin  Negative


 


Urine Urobilinogen  4.0 e.u/dl H


 


Ur Leukocyte Esterase  3+ H D


 


Urine RBC  850


 


Urine WBC  33


 


Ur Epithelial Cells  Rare


 


Urine Mucus  Rare


 


Urine HCG, Qual  Negative





IMAGES





Kidney U/S (07/19/17): No hydronephrosis.  1.1cm nonobstructing left renal 

stone. 





ASSESSMENT/PLAN:


Patient is a 47 year old female with a PMHx of HTN, nephrolithiasis s/p 

lithotripsy with stent placement (07/14/17) who presented with worsening left 

flank pain with fevers and nausea despite taking Bactrim. Patient was found to 

have positive U/A and admitted for further monitoring and management. 





Left Flank Pain likely Secondary to Pyelonephritis vs. UTI


-S/P lithotripsy w/ stent placement (07/14/17)


-U/A positive 3+ LE, WBC's 33, RBC's 850 with severe left CVA tenderness 


-Previous urine cultures contaminated 


-Continue Ceftriaxone 2gm IVPB daily 


-Continue IV fluids for hydration


-Continue pain control with Morphine PRN


-Continue Zofran PRN for nausea and vomiting


-Tylenol PRN for fever


-Urine and blood cultures pending 


-NPO


-Urology consult placed 





HTN- Continue current home medication with Lisinopril. Continue to monitor BP 


Iron Def. Anemia- Continue home medication Ferrous Sulfate 


F/E/N- Continue IV Fluids. Electrolytes wnl. NPO for possible procedure 


Prophylaxis- Low Risk. EAM. Heparin SQ for DVT


Disposition- Full code. Admit to med/surg and wait for Urology consult.








Visit type





- Emergency Visit


Emergency Visit: Yes


ED Registration Date: 07/20/17


Care time: The patient presented to the Emergency Department on the above date 

and was hospitalized for further evaluation of their emergent condition.





- New Patient


This patient is new to me today: Yes


Date on this admission: 07/20/17





- Critical Care


Critical Care patient: No

## 2017-07-20 NOTE — PN
Progress Note (short form)





- Note


Progress Note: 


ID Consult dictated


Complicated UTI, possible sepsis secondary to UTI


S/P Ureteral stent placement


Await cultures


Empiric cefepime


Urology evaluation

## 2017-07-21 NOTE — DS
Physical Exam: 


SUBJECTIVE: Patient seen and examined this AM. Pain is resolving. No fevers, no 

chills, no CP, no SOB. 








OBJECTIVE:





 Vital Signs











 Period  Temp  Pulse  Resp  BP Sys/Dodd  Pulse Ox


 


 Last 24 Hr  98.1 F-99.2 F  88-94  20-20  129-137/77-86  








PHYSICAL EXAM





GENERAL: The patient is awake, alert, and fully oriented, in moderate distress 

due to pain


HEENT: PERRLA, ECOMi, Pt wears glasses, conjunctiva clear, neck supple without 

LAD


LUNGS: CTABL, no wheezes, no crackles, no accessory muscle use


HEART: RRR. S1, S2, no murmurs, no rubs, no gallops


ABDOMEN: Soft, nondistended, normoactive bowel sounds. TTP in suprapubic region 

+ L CVA tenderness, no CVA tenderness on R, all other regions of abdomen are non

-tender


EXTREMITIES: 2+ pulses, warm, well-perfused, no edema


NEUROLOGICAL: A full neurological exam was performed. Patient is oriented x3. 

Cranial nerves 2-12 were grossly intact. Sensation was equal and intact in face 

and body bilaterally. Muscles were 5/5 in all extremities. Reflexes 2+. Normal 

speech, gait not observed. 





LABS


 Laboratory Results - last 24 hr











  07/21/17 07/21/17





  06:45 06:45


 


WBC  10.7 H 


 


RBC  3.37 L 


 


Hgb  9.0 L 


 


Hct  28.1 L 


 


MCV  83.5 


 


MCH  26.7 


 


MCHC  32.0 


 


RDW  19.3 H 


 


Plt Count  281 


 


MPV  7.7 


 


Sodium   139


 


Potassium   3.9


 


Chloride   107


 


Carbon Dioxide   24


 


Anion Gap   8


 


BUN   8


 


Creatinine   0.7


 


Random Glucose   87


 


Calcium   8.2 L











HOSPITAL COURSE:





Date of Admission:07/20/17





Date of Discharge: 07/21/17





Patient is a 47 year old female with PMHx of HTN and nephrolithiasis who 

recently had a L uteroscopic laser lithotripsy and JJ stent placement. She 

presented to ED five days post-op with fever, nausea, left flank pain that was 

consistent with pyelonephritis vs UTI, and was admitted for further monitoring 

and management. 





# Urinary Tract Infection


- The patient was initially taking Bactrim which was given from the urology 

surgeon postop. While in the ER, the patient received Zofran 8mg IVPB Q6H PRN 

for nausea and antibiotics were switched to Rocephin 2gm IV. Urine analysis was 

positive for 2+ protein, 2+ blood, and 3+ leukocyte esterase. Later urine 

cultures (which were taken after Bactrim usage) were negative.The patient was 

examined by Infectious disease doctors who switched the patient from Rocephin 

to Cefepime IV 1gm. The patient was placed on Tylenol 650mg PO Q4H PRN and 

Morphine IV 4mg PRN for pain management. Dr Gennaro Dacosta (patients urologist) 

followed the patient during hospitalization, and removed the stent at bedside 

on 07/20/2017. The patient will be discharged on Augmentin 875mg BID for one 

week + Acetaminophen for pain control, and will follow-up with Dr Dacosta in one 

week.





#History of Hypertension - well controlled


- The patient was maintained on her home medication Lisinopril 20mg PO daily 

and daily BPs were monitored. BP remained controlled and stable throughout 

hospital course





# History of Iron Deficiency Anemia


- The patient was maintained on her home medication Ferrous Sulfate 325mg PO HS 

NEVAEH and H/H remained stable throughout hospital course. Patient remained 

asymptomatic.





The hospital course and plan were discussed with the patient who verbalized 

understanding.





Minutes to complete discharge: 55





Discharge Summary


Reason For Visit: URINARY TRACT INFECTION


Current Active Problems





Pyelonephritis (Acute) 


Kidney stone (Chronic) 


Right upper quadrant abdominal pain (Chronic) 








Condition: Improved





- Instructions


Diet, Activity, Other Instructions: 


You were admitted for evaluation and treatment for urinary tract infection and 

was treated with IV antibiotics. Now we are sending you home on PO antibiotics (

Augmentin 1 tab two times a day for 7 days). Please follow up with Dr. Dacosta in 

a week. 





Please also follow up with your primary doctor in a week. 





Return to the Emergency Department if symptoms get worse or if you develop any 

new symptoms. 


Referrals: 


Nathan Mathis MD [Primary Care Provider] - 2 Weeks


Quan Dacosta MD [Staff Physician] - 1 Week


Disposition: HOME





- Home Medications


Comprehensive Discharge Medication List: 


Ambulatory Orders





Acetaminophen [Tylenol] 650 mg PO PRN PRN 07/11/17 


Ferrous Sulfate [Feosol] 325 mg PO HS 07/11/17 


Lisinopril [Prinivil] 20 mg PO HS 07/11/17 


Omega-3 Fatty Acids [Fish Oil] 300 mg PO DAILY 07/11/17 


Oxycodone HCl/Acetaminophen [Percocet 5-325 mg Tablet] 1 - 2 tab PO Q4H PRN #60 

tablet MDD 6 07/14/17 


Amoxicillin/Potassium Clav [Augmentin 875-125 Tablet] 1 each PO BID #14 tablet 

07/21/17 











Problem List





- Problems


(1) Kidney stone


Code(s): N20.0 - CALCULUS OF KIDNEY





(2) Urinary tract infection


Code(s): N39.0 - URINARY TRACT INFECTION, SITE NOT SPECIFIED   





This patient is new to me today: No


Emergency Visit: No


Critical Care patient: No





- Discharge Referral


Referred to [facility] No

## 2017-07-21 NOTE — PN
Teaching Attending Note


Name of Resident: Jessica Holley


ATTENDING PHYSICIAN STATEMENT





I saw and evaluated the patient.


I reviewed the resident's note and discussed the case with the resident.


I agree with the resident's findings and plan as documented.








SUBJECTIVE:


Patient is feeling better, with no acute distress. would like to go  home.





OBJECTIVE:


 Vital Signs











Temperature  98.2 F   07/21/17 15:24


 


Pulse Rate  94 H  07/21/17 15:24


 


Respiratory Rate  20   07/21/17 15:24


 


Blood Pressure  137/84   07/21/17 15:24


 


O2 Sat by Pulse Oximetry (%)  100   07/20/17 01:40








 CBCD











WBC  10.7 K/mm3 (4.0-10.0)  H  07/21/17  06:45    


 


RBC  3.37 M/mm3 (3.60-5.2)  L  07/21/17  06:45    


 


Hgb  9.0 GM/dL (10.7-15.3)  L  07/21/17  06:45    


 


Hct  28.1 % (32.4-45.2)  L  07/21/17  06:45    


 


MCV  83.5 fl (80-96)   07/21/17  06:45    


 


MCHC  32.0 g/dl (32.0-36.0)   07/21/17  06:45    


 


RDW  19.3 % (11.6-15.6)  H  07/21/17  06:45    


 


Plt Count  281 K/MM3 (134-434)   07/21/17  06:45    


 


MPV  7.7 fl (7.5-11.1)   07/21/17  06:45    








 CMP











Sodium  139 mmol/L (136-145)   07/21/17  06:45    


 


Potassium  3.9 mmol/L (3.5-5.1)   07/21/17  06:45    


 


Chloride  107 mmol/L ()   07/21/17  06:45    


 


Carbon Dioxide  24 mmol/L (21-32)   07/21/17  06:45    


 


Anion Gap  8  (8-16)   07/21/17  06:45    


 


BUN  8 mg/dL (7-18)   07/21/17  06:45    


 


Creatinine  0.7 mg/dL (0.55-1.02)   07/21/17  06:45    


 


Creat Clearance w eGFR  59.69  (>60)   07/19/17  20:58    


 


Random Glucose  87 mg/dL ()   07/21/17  06:45    


 


Calcium  8.2 mg/dL (8.5-10.1)  L  07/21/17  06:45    


 


Total Bilirubin  0.2 mg/dL (0.2-1.0)   07/19/17  20:58    


 


AST  14 U/L (15-37)  L  07/19/17  20:58    


 


ALT  19 U/L (12-78)   07/19/17  20:58    


 


Alkaline Phosphatase  107 U/L ()  D 07/19/17  20:58    


 


Total Protein  7.1 g/dl (6.4-8.2)   07/19/17  20:58    


 


Albumin  2.8 g/dl (3.4-5.0)  L  07/19/17  20:58    








 Current Medications











Generic Name Dose Route Start Last Admin





  Trade Name Freq  PRN Reason Stop Dose Admin


 


Acetaminophen  650 mg 07/20/17 05:06 07/20/17 21:56





  Tylenol -  PO   650 mg





  Q4H PRN   Administration





  PAIN   


 


Ferrous Sulfate  325 mg 07/20/17 22:00 07/20/17 21:42





  Feosol -  PO   325 mg





  HS NEVAEH   Administration


 


Heparin Sodium (Porcine)  5,000 unit 07/20/17 03:30 07/21/17 10:52





  Heparin -  SQ   Not Given





  Q8H-IV NEVAEH   


 


Sodium Chloride  1,000 mls @ 100 mls/hr 07/20/17 03:30 07/21/17 08:07





  Normal Saline -  IV   Not Given





  ASDIR NEVAEH   


 


Cefepime HCl 1 gm/ Dextrose  100 mls @ 200 mls/hr 07/20/17 18:00 07/21/17 10:50





  IVPB   200 mls/hr





  Q8H-IV NEVAEH   Administration


 


Lisinopril  20 mg 07/20/17 22:00 07/20/17 21:42





  Prinivil  PO   20 mg





  HS NEVAEH   Administration


 


Ondansetron HCl  8 mg 07/20/17 03:21 07/20/17 08:00





  Zofran Injection  IVPB   8 mg





  Q6H PRN   Administration





  NAUSEA   








 Home Medications











 Medication  Instructions  Recorded


 


Acetaminophen [Tylenol] 650 mg PO PRN PRN 07/11/17


 


Ferrous Sulfate [Feosol] 325 mg PO HS 07/11/17


 


Lisinopril [Prinivil] 20 mg PO HS 07/11/17


 


Omega-3 Fatty Acids [Fish Oil] 300 mg PO DAILY 07/11/17


 


Oxycodone HCl/Acetaminophen 1 - 2 tab PO Q4H PRN #60 tablet 07/14/17





[Percocet 5-325 mg Tablet] MDD 6 


 


Sulfamethoxazole/Trimethoprim 1 each PO BID #14 tablet 07/14/17





[Bactrim Ds Tablet]  








 Microbiology





07/19/17 21:30   Urine - Urine Clean Catch   Urine Culture - Final


                            NO GROWTH OBTAINED


07/19/17 21:00   Blood - Peripheral Venous   Blood Culture - Preliminary


                            NO GROWTH OBTAINED AFTER 24 HOURS, INCUBATION TO 

CONTINUE


                            FOR 4 DAYS.


07/19/17 21:00   Blood - Peripheral Venous   Blood Culture - Preliminary


                            NO GROWTH OBTAINED AFTER 24 HOURS, INCUBATION TO 

CONTINUE


                            FOR 4 DAYS.








PE: per resident's note


No CVA tenderness.





Renal Ultrasound 07-: Right kidney appears normal. The left kidney 

contains a 1.1 cm nonobstructing stone in the midpole without hydronephrosis.





ASSESSMENT/PLAN:


Pt is a 46yo F with h/o HTN and nephrolithiasis who is POD#5 from L 

ureteroscopic laser lithotripsy and JJ stent placement AND WAS placed on  PO 

Bactrim and was admitted for UTI/Pylonephritis 





# Acute  UTI vs Left  pyelonephritis s/p  JJ stent removal by Dr. Quan Dacosta s/

p IV antibiotic Rocephin 2gm then as per ID ,IV was changed to Cefepime 


As per ID and urologist , patient can be discharged home on Augmentin 875mg po 

bid x 7 days. Follow with Dr.Marc Dacosta in a week period.


urine culture is negative.





#HTN -  controlled continue home meds





#Anemia asymptomatic at this moment, on  home ferrous sulfate 325mg PO continue





 will discharge patient home on Augmentin 875mg po bid x 7 days

## 2017-07-21 NOTE — PN
Progress Note (short form)





- Note


Progress Note: 


pt w/o c/o


less L flank pain after stent removed


vss afeb


- CVAT


abd soft nt


urine c+s NG


Imp: L renal calculus, ? UTI s/p LULL


Rec: OK for disch on Augmentin 875 mg bid x 1 week, RTO next week

## 2017-07-21 NOTE — PN
Progress Note, Physician


History of Present Illness: 


S/P removal of L ureteral stent


Reports some L flank and suprapubic discomfort


No fever/ chills


Cultures negative





- Current Medication List


Current Medications: 


Active Medications





Acetaminophen (Tylenol -)  650 mg PO Q4H PRN


   PRN Reason: PAIN


   Last Admin: 07/20/17 21:56 Dose:  650 mg


Ferrous Sulfate (Feosol -)  325 mg PO HS NEVAEH


   Last Admin: 07/20/17 21:42 Dose:  325 mg


Heparin Sodium (Porcine) (Heparin -)  5,000 unit SQ Q8H-IV NEVAEH


   Last Admin: 07/21/17 10:52 Dose:  Not Given


Sodium Chloride (Normal Saline -)  1,000 mls @ 100 mls/hr IV ASDIR NEVAEH


   Last Admin: 07/21/17 08:07 Dose:  Not Given


Cefepime HCl 1 gm/ Dextrose  100 mls @ 200 mls/hr IVPB Q8H-IV NEVAEH


   Last Admin: 07/21/17 10:50 Dose:  200 mls/hr


Lisinopril (Prinivil)  20 mg PO HS NEVAEH


   Last Admin: 07/20/17 21:42 Dose:  20 mg


Ondansetron HCl (Zofran Injection)  8 mg IVPB Q6H PRN


   PRN Reason: NAUSEA


   Last Admin: 07/20/17 08:00 Dose:  8 mg











- Objective


Vital Signs: 


 Vital Signs











Temperature  98.1 F   07/21/17 09:00


 


Pulse Rate  88   07/21/17 09:00


 


Respiratory Rate  20   07/21/17 09:00


 


Blood Pressure  129/86   07/21/17 09:00


 


O2 Sat by Pulse Oximetry (%)  100   07/20/17 01:40











Constitutional: Yes: No Distress


Eyes: Yes: Conjunctiva Clear


Cardiovascular: Yes: Regular Rate and Rhythm, S1, S2


Respiratory: Yes: CTA Bilaterally


Gastrointestinal: Yes: Normal Bowel Sounds, Soft, Other (mild suprapubic 

tenderness)


Genitourinary: Yes: CVA Tenderness - Left


Edema: No


Labs: 


 CBC, BMP





 07/21/17 06:45 





 07/21/17 06:45 





 INR, PTT











INR  0.98  (0.82-1.09)   07/20/17  06:30    














Assessment/Plan


S/P removal L ureteral stent


UTI


Nephrolithiasis





Substitute po Augmentin additional 7d


Outpatient  follow up

## 2017-09-26 NOTE — OP
Operative Note





- Note:


Operative Date: 09/26/17


Pre-Operative Diagnosis: L renal calculus


Operation: ESWL L


Findings: 


L renal calculus


Post-Operative Diagnosis: Same as Pre-op


Surgeon: Quan Dacosta


Anesthesiologist/CRNA: Veronica Elizondo


Anesthesia: Fractional


Estimated Blood Loss (mls): 0


Operative Report Dictated: Yes

## 2017-09-27 NOTE — OP
DATE OF OPERATION:  09/26/2017 

 

PREOPERATIVE DIAGNOSIS:  Left ureteral calculus.

 

POSTOPERATIVE DIAGNOSIS:  Left ureteral calculus.

 

PROCEDURE PERFORMED:  Extracorporeal shock wave lithotripsy of left renal calculus.

 

SURGEON:  Quan Nino M.D.

 

ASSISTANT:  None. 

 

ANESTHESIA:  IV sedation.

 

ANESTHESIOLOGIST:  Veronica Elizondo MD 

 

SPECIMENS:  None.

 

CULTURES:  None.

 

DRAINS:  None. 

 

ESTIMATED BLOOD LOSS:  None.  

 

COMPLICATIONS:  None.

 

DESCRIPTION OF PROCEDURE:  The patient was brought into the operating room and placed

on the operating table in the supine position.  After the administration of

intravenous sedation, the patient was positioned over the treatment head.  Under

ultrasound guidance, an approximately 9-mm left mid to lower pole renal calculus was

identified, targeted and delivered 2500 shocks at maximum kilovoltage, with excellent

fragmentation. 

 

She tolerated the procedure well.  She was transferred to the recovery room in stable

condition. 

 

 

QUAN NINO M.D.

 

[id number]

DD: 09/26/2017 07:48

DT: 09/27/2017 06:53

Job #:  [id number]

## 2019-03-18 ENCOUNTER — HOSPITAL ENCOUNTER (OUTPATIENT)
Dept: HOSPITAL 74 - JASUSAT | Age: 49
LOS: 1 days | Discharge: HOME | End: 2019-03-19
Attending: OBSTETRICS & GYNECOLOGY
Payer: COMMERCIAL

## 2019-03-18 VITALS — BODY MASS INDEX: 26.7 KG/M2

## 2019-03-18 DIAGNOSIS — R10.2: ICD-10-CM

## 2019-03-18 DIAGNOSIS — D25.9: Primary | ICD-10-CM

## 2019-03-18 DIAGNOSIS — N13.30: ICD-10-CM

## 2019-03-18 LAB
ANION GAP SERPL CALC-SCNC: 11 MMOL/L (ref 8–16)
BUN SERPL-MCNC: 14 MG/DL (ref 7–18)
CALCIUM SERPL-MCNC: 7.7 MG/DL (ref 8.5–10.1)
CHLORIDE SERPL-SCNC: 105 MMOL/L (ref 98–107)
CO2 SERPL-SCNC: 21 MMOL/L (ref 21–32)
CREAT SERPL-MCNC: 1.5 MG/DL (ref 0.55–1.3)
DEPRECATED RDW RBC AUTO: 18.6 % (ref 11.6–15.6)
GLUCOSE SERPL-MCNC: 209 MG/DL (ref 74–106)
HCT VFR BLD CALC: 30.5 % (ref 32.4–45.2)
HGB BLD-MCNC: 9.7 GM/DL (ref 10.7–15.3)
MCH RBC QN AUTO: 24.6 PG (ref 25.7–33.7)
MCHC RBC AUTO-ENTMCNC: 31.6 G/DL (ref 32–36)
MCV RBC: 77.8 FL (ref 80–96)
PLATELET # BLD AUTO: 301 K/MM3 (ref 134–434)
PMV BLD: 8.5 FL (ref 7.5–11.1)
POTASSIUM SERPLBLD-SCNC: 4.1 MMOL/L (ref 3.5–5.1)
RBC # BLD AUTO: 3.93 M/MM3 (ref 3.6–5.2)
SODIUM SERPL-SCNC: 137 MMOL/L (ref 136–145)
WBC # BLD AUTO: 15.6 K/MM3 (ref 4–10)

## 2019-03-18 PROCEDURE — 58571 TLH W/T/O 250 G OR LESS: CPT

## 2019-03-18 PROCEDURE — 0UT78ZZ RESECTION OF BILATERAL FALLOPIAN TUBES, VIA NATURAL OR ARTIFICIAL OPENING ENDOSCOPIC: ICD-10-PCS | Performed by: OBSTETRICS & GYNECOLOGY

## 2019-03-18 PROCEDURE — 0UT98ZZ RESECTION OF UTERUS, VIA NATURAL OR ARTIFICIAL OPENING ENDOSCOPIC: ICD-10-PCS | Performed by: OBSTETRICS & GYNECOLOGY

## 2019-03-18 PROCEDURE — 8E0W8CZ ROBOTIC ASSISTED PROCEDURE OF TRUNK REGION, VIA NATURAL OR ARTIFICIAL OPENING ENDOSCOPIC: ICD-10-PCS | Performed by: OBSTETRICS & GYNECOLOGY

## 2019-03-18 RX ADMIN — PHENAZOPYRIDINE ONE: 100 TABLET ORAL at 15:34

## 2019-03-18 RX ADMIN — PHENAZOPYRIDINE ONE MG: 100 TABLET ORAL at 07:15

## 2019-03-18 RX ADMIN — ATORVASTATIN CALCIUM SCH: 40 TABLET, FILM COATED ORAL at 22:22

## 2019-03-18 RX ADMIN — CEFAZOLIN SODIUM SCH MLS/HR: 1 INJECTION, SOLUTION INTRAVENOUS at 17:49

## 2019-03-18 NOTE — HP
History & Physical Update





- History


History: No Change





- Physical


Physical: No Change





- Assessment


Assessment: No Change





- Plan


Plan: No Change (Full H&P on 2/25/19)

## 2019-03-18 NOTE — OP
Operative Note





- Note:


Operative Date: 03/18/19


Pre-Operative Diagnosis: Fibroid uterus


Operation: Robotic assisted hysterectomy and bilateral salpingectomy


Post-Operative Diagnosis: Same as Pre-op


Surgeon: Barbara Ward


Assistant: Chong Sanchez


Anesthesiologist/CRNA: Jonathan Newman


Anesthesia: General


Operative Report Dictated: Yes

## 2019-03-18 NOTE — OP
Operative Note





- Note:


Operative Date: 03/18/19


Pre-Operative Diagnosis: Chronic Pelvic Pain.  Leiomyomatous Uterus


Operation: Robotic Laparoscopic Total Hysterectomy.  Bilateral Salpingectomy


Findings: 





Uterus 10 cm





Post-Operative Diagnosis: Same as Pre-op


Surgeon: Barbara Ward


Assistant: Chong Sanchez


Anesthesia: General


Estimated Blood Loss (mls): 25


Operative Report Dictated: Yes

## 2019-03-18 NOTE — OP
DATE OF OPERATION:  03/18/2019

 

PREOPERATIVE DIAGNOSIS:  Pelvic pain, leiomyomatous uterus.

 

OPERATION:  Robotic laparoscopic total hysterectomy and bilateral 
salpingectomy.  

 

SURGEON:  Indira Johnson MD 

 

ASSISTANT:  HARLEEN Schwarz 

 

ANESTHESIA:  General.

 

ANESTHESIOLOGIST:  Tad Roberts MD, nurse anesthetist, Jonathan Newman.

 

FINDINGS:  Uterus approximately 10 cm in size with bowel attached to the uterus 
as

well as the anterior abdominal wall.

 

ESTIMATED BLOOD LOSS:  50 mL.

 

DESCRIPTION OF PROCEDURE:  The patient was taken to the operating room and 
placed in

dorsal lithotomy position, prepped and draped in the usual sterile fashion.  A

time-out was performed in accordance with hospital regulation.  Speculum was 
placed

in the vagina.  Anterior lip of the cervix was grasped with a single-tooth 
tenaculum.

 The cervix was then dilated to accommodate the uterine manipulator.  Pedro 
catheter

was then inserted.  Attention was then drawn to the umbilicus where an 8-mm 
umbilical

incision was made.  Veress needle was inserted into the cavity.  Approximately 3
-4 L

of CO2 was insufflated in the cavity.  Veress needle was then removed.  An 8-mm

trocar was then introduced.  Laparoscope with a camera was attached.  
Visualization

revealed numerous adhesions.  A 2nd and 3rd trocars were placed on the left 
side 8 mm

parallel to the umbilical incision.  Scalpel was then used, and 8-mm trocars 
were

then inserted under direct visualization.  Scope was then introduced, and 
anterior

lysis was then performed to remove the omental adhesions noted on the left 
side.  A

4th trocar was then inserted on the left side as well as an upper abdomen 
incision 5

mm.  An AirSeal cannula was introduced.  Trocars were placed correctly, and

instruments were then introduced.  Vessel sealer was placed on the left.  
Tenaculum

was placed on the right as well as Endo Austin on the right.  After proper 
placement

of the trocars and instruments done, attention was then drawn to the console 
where a

tenaculum was then used to elevate the uterus to the right side.  The uterine 
ovarian

ligament was identified, clamped, and cut.  The uterine artery was identified,

clamped, and cut.  Vesicouterine reflection was then entered, and the bladder 
was

bluntly dissected out of the operative field.  The bladder was severely 
adherent to

the uterus.  The ureters were identified and found to have peristalsis.  The 
same

procedure was repeated on the other side.  The utero-ovarian ligament was 
identified,

clamped, and cut.  The patient had a tubal ligation, and the proximal end of 
the tube

was removed.  The uterine artery was then identified, clamped, and cut.  The 
cardinal

ligament was identified, clamped, and cut down to the level of the cervix.  The

vagina was then entered, and circumferential incision was made on the vagina 
around

the cervix.  The uterus was identified and found to be peristalsis.  The cervix 
was

then removed, and the uterus and tubes were bilaterally grasped.  The distal 
end was

bilaterally grasped, and coagulation with the LigaSure was done cutting, and

bilateral tubes were removed.  Distal portion of the tubes were removed.  The 
ovaries

were noted to be normal.  The 2-0 V-Lock suture was introduced, and the Da Mateus

robot was then used to close the vagina.  Hemostasis was achieved.  Ureters were

identified and right found to have the peristalsis.  Orange urine was seen 
coming out of

the Pedro due to the Pyridium that had been given prior.  Suture was removed.  
The

needle was then removed from the abdomen.  All bleeding was found to be 
hemostatic. 

CO2 was removed from the abdomen.  Trocars were removed.  Incision were then 
closed

using 4-0 Biosyn suture in a subcuticular fashion.  The wound was washed and 
dressed.

 The patient tolerated the procedure well and was taken to the recovery room in

stable condition. 

 

 

INDIRA JOHNSON M.D.

 

[id number]

DD: 03/18/2019 11:06

DT: 03/18/2019 12:19

Job #:  [id number]

MTDD

## 2019-03-18 NOTE — SURG
Surgery First Assist Note


First Assist: Chong Sanchez PA-C


Date of Service: 03/18/19


Diagnosis: 


Fibroid uterus





Procedure: 


Robotic assisted hysterectomy and bilateral salpingectomy





I was present for the entirety of the operative procedure. For further detail, 

please refer to operative report.








Visit type





- Case Type


Case Type: Scheduled





- Emergency


Emergency Visit: No





- New patient


This patient is new to me today: Yes


Date on this admission: 03/18/19

## 2019-03-19 VITALS — SYSTOLIC BLOOD PRESSURE: 125 MMHG | DIASTOLIC BLOOD PRESSURE: 65 MMHG | TEMPERATURE: 98.4 F | HEART RATE: 77 BPM

## 2019-03-19 LAB
ANION GAP SERPL CALC-SCNC: 4 MMOL/L (ref 8–16)
BUN SERPL-MCNC: 14 MG/DL (ref 7–18)
CALCIUM SERPL-MCNC: 8 MG/DL (ref 8.5–10.1)
CHLORIDE SERPL-SCNC: 107 MMOL/L (ref 98–107)
CO2 SERPL-SCNC: 26 MMOL/L (ref 21–32)
CREAT SERPL-MCNC: 1.1 MG/DL (ref 0.55–1.3)
DEPRECATED RDW RBC AUTO: 18.9 % (ref 11.6–15.6)
GLUCOSE SERPL-MCNC: 88 MG/DL (ref 74–106)
HCT VFR BLD CALC: 26.1 % (ref 32.4–45.2)
HGB BLD-MCNC: 8.4 GM/DL (ref 10.7–15.3)
MCH RBC QN AUTO: 24.5 PG (ref 25.7–33.7)
MCHC RBC AUTO-ENTMCNC: 32.2 G/DL (ref 32–36)
MCV RBC: 76.3 FL (ref 80–96)
PLATELET # BLD AUTO: 274 K/MM3 (ref 134–434)
PMV BLD: 8.5 FL (ref 7.5–11.1)
POTASSIUM SERPLBLD-SCNC: 4 MMOL/L (ref 3.5–5.1)
RBC # BLD AUTO: 3.42 M/MM3 (ref 3.6–5.2)
SODIUM SERPL-SCNC: 138 MMOL/L (ref 136–145)
WBC # BLD AUTO: 12.5 K/MM3 (ref 4–10)

## 2019-03-19 RX ADMIN — CEFAZOLIN SODIUM SCH MLS/HR: 1 INJECTION, SOLUTION INTRAVENOUS at 10:07

## 2019-03-19 RX ADMIN — SIMETHICONE CHEW TAB 80 MG PRN MG: 80 TABLET ORAL at 13:38

## 2019-03-19 RX ADMIN — ACETAMINOPHEN PRN MG: 325 TABLET ORAL at 13:38

## 2019-03-19 RX ADMIN — ATORVASTATIN CALCIUM SCH MG: 40 TABLET, FILM COATED ORAL at 11:10

## 2019-03-19 RX ADMIN — CEFAZOLIN SODIUM SCH MLS/HR: 1 INJECTION, SOLUTION INTRAVENOUS at 01:24

## 2019-03-19 RX ADMIN — ACETAMINOPHEN PRN MG: 325 TABLET ORAL at 01:34

## 2019-03-19 RX ADMIN — SIMETHICONE CHEW TAB 80 MG PRN MG: 80 TABLET ORAL at 01:32

## 2019-03-19 RX ADMIN — SIMETHICONE CHEW TAB 80 MG PRN MG: 80 TABLET ORAL at 08:01

## 2019-03-19 RX ADMIN — ACETAMINOPHEN PRN MG: 325 TABLET ORAL at 08:03

## 2019-03-19 NOTE — CON.GU
Consult


Consult Specialty:: 


Referred by:: Sylvia


Reason for Consultation:: hydronephrosis





- History of Present Illness


Chief Complaint: hydronephrosis


History of Present Illness: 





patient is a 48 year old woman POD #1 from hysterectomy for benign disease. It 

was uneventful however she had a transient increase in her creatinine post op 

which has resolved.  She haed an US which showed normal right kidney but marked 

left hydro with thin parenchyma consistent with long standing obstruction. She 

reports that two years ago she had bilateral lithotripsies at [place].  

She is asymptomatic from this finding





- History Source


History Provided By: Patient


Limitations to Obtaining History: No Limitations





- Past Medical History


Renal/: Yes: Renal Calculi, Other


...LMP: 03/09/19





- Alcohol/Substance Use


Hx Alcohol Use: Yes (SOCIALLY)





- Smoking History


Smoking history: Current some day smoker


Have you smoked in the past 12 months: Yes


Aproximately how many cigarettes per day: 1





Home Medications





- Allergies


Allergies/Adverse Reactions: 


 Allergies











Allergy/AdvReac Type Severity Reaction Status Date / Time


 


No Known Allergies Allergy   Verified 03/18/19 06:38














- Home Medications


Home Medications: 


Ambulatory Orders





Acetaminophen [Tylenol] 650 mg PO PRN PRN 07/11/17 


Omega-3 Fatty Acids [Fish Oil] 300 mg PO DAILY 07/11/17 


Atorvastatin Ca [Lipitor] 40 mg PO DAILY 03/12/19 


Docusate Sodium [Dulcolax Stool Softener] 100 mg PO BID PRN 30 Days #28 capsule 

03/19/19 


oxyCODONE HCL [Roxicodone -] 5 mg PO Q6H PRN 7 Days #20 tablet MDD 5 03/19/19 











Review of Systems





- Review of Systems


Genitourinary: reports: No Symptoms





Physical Exam-


Vital Signs: 


 Vital Signs











Temperature  98.4 F   03/19/19 09:25


 


Pulse Rate  77   03/19/19 09:25


 


Respiratory Rate  18   03/19/19 09:25


 


Blood Pressure  125/65   03/19/19 09:25


 


O2 Sat by Pulse Oximetry (%)  100   03/18/19 21:00











Renal/: Yes: Pedro Present.  No: Bladder Distention, CVA Tenderness - Left, 

CVA Tenderness - Right, Hematuria


Labs: 


 CBC, BMP





 03/19/19 07:00 





 03/19/19 07:00 











Imaging





- Results


Ultrasound: Report Reviewed





Problem List





- Problems


(1) Hydronephrosis


Assessment/Plan: 


CT scan to evaluate anatomy.  renal scan as outpatient to assess renal function


Code(s): N13.30 - UNSPECIFIED HYDRONEPHROSIS

## 2019-03-19 NOTE — PN
Progress Note (SOAP)





- Subjective


Chief Complaint: 





Saw patient at bedside this morm eager to go home


no flatus


shankar draining urine 





- Current Medications


Current Medications: 


Active Medications





Acetaminophen (Tylenol -)  650 mg PO Q4H PRN


   PRN Reason: FEVER


   Last Admin: 03/19/19 08:03 Dose:  650 mg


Atorvastatin Calcium (Lipitor -)  40 mg PO HS NEVAEH


   Last Admin: 03/18/19 22:22 Dose:  Not Given


Bisacodyl (Dulcolax -)  10 mg PO ONCE PRN


   PRN Reason: CONSTIPATION


Docusate Sodium (Colace -)  100 mg PO TID PRN


   PRN Reason: CONSTIPATION


Enoxaparin Sodium (Lovenox -)  40 mg SQ DAILY NEVAEH


Fentanyl (Sublimaze Injection -)  50 mcg IVPUSH [id number] PRN


   PRN Reason: PAIN-PACU ORDER X 4 DOSES ONLY


   Last Admin: 03/18/19 12:29 Dose:  50 mcg


Lactated Ringer's (Lactated Ringers Solution)  1,000 mls @ 75 mls/hr IV ASDIR 

NEVAEH


Cefazolin Sodium (Ancef 1 Gm Premixed Ivpb -)  1 gm in 50 mls @ 100 mls/hr IVPB 

Q8H-IV NEVAEH


   Stop: 03/19/19 17:59


   Last Admin: 03/19/19 01:24 Dose:  100 mls/hr


Non-Formulary Medication (Omega-3 Fatty Acids [Fish Oil])  300 mg PO DAILY [facility]


Ondansetron HCl (Zofran Injection)  4 mg IVPUSH Q6H PRN


   PRN Reason: NAUSEA AND/OR VOMITING


Ondansetron HCl (Zofran Injection)  4 mg IVPUSH Q4H PRN


   PRN Reason: NAUSEA AND/OR VOMITING


Oxycodone HCl (Roxicodone -)  5 mg PO Q4H PRN


   PRN Reason: PAIN LEVEL 1-5


Oxycodone HCl (Roxicodone -)  10 mg PO Q4H PRN


   PRN Reason: PAIN LEVEL 6-10


Simethicone (Mylicon -)  80 mg PO Q4H PRN


   PRN Reason: GAS


   Last Admin: 03/19/19 08:01 Dose:  80 mg











- Objective


Vital Signs: 


 Vital Signs











Temperature  98.1 F   03/19/19 05:55


 


Pulse Rate  82   03/19/19 05:55


 


Respiratory Rate  18   03/19/19 05:55


 


Blood Pressure  129/74   03/19/19 05:55


 


O2 Sat by Pulse Oximetry (%)  100   03/18/19 21:00











Constitutional: Yes: Well Nourished, No Distress


Gastrointestinal: Yes: WNL, Soft


Musculoskeletal: Yes: WNL


Extremities: Yes: WNL


Peripheral Pulses WNL: No


Wound/Incision: Yes: Dressing Dry and Intact


Neurological: Yes: WNL, Alert, Oriented





Labs


Lab Results: 


 CBC, BMP





 03/19/19 07:00 





 03/19/19 07:00 











Problem List





- Problems


(1) Elevated serum creatinine


Code(s): R79.89 - OTHER SPECIFIED ABNORMAL FINDINGS OF BLOOD CHEMISTRY   





Assessment/Plan





Sp Laparoscopic robotic total hysterrectomy


POD1


ellevated creatinine last night





Plan


Keep shankar catheter


kidney ultrasound


consult urology

## 2019-03-19 NOTE — PN
Progress Note (short form)





- Note


Progress Note: 





POD 1, s/p Robotic Laparoscopic Total Hysterectomy.  Bilateral Salpingectomy











Pt seen and examined this AM. Reports feeling well. Pain is controlled with 

pain meds. Tolerating clears, no flatus yet. Has not been oob. Pedro in place 

upon initial examination. Denies cp/sob, n/v/d, calf pain/edema.














 Vital Signs











Temp  98.4 F   03/19/19 09:25


 


Pulse  77   03/19/19 09:25


 


Resp  18   03/19/19 09:25


 


BP  125/65   03/19/19 09:25


 


Pulse Ox  100   03/18/19 21:00








 Intake & Output











 03/18/19 03/18/19 03/19/19





 11:59 23:59 11:59


 


Intake Total 0758 841 1329


 


Output Total 200 750 950


 


Balance 1600 -50 150


 


Intake:   


 


  IV 1800 500 800


 


    Lactated Ringers Solution  500 800





    1,000 ml @ 75 mls/hr IV   





    ASDIR NEVAEH Rx#:[id number]   


 


  IVPB  50 300


 


  Oral  150 


 


Output:   


 


  Urine 175 750 950


 


    Pedro  400 950


 


  Estimated Blood Loss 25  


 


Other:   


 


  Voiding Method  Indwelling Catheter 








 CBC, BMP





 03/19/19 07:00 





 03/19/19 07:00 











Gen: awake, alert, nad


Resp: unlabored on RA, cta b/l anteriorly


CV: rrr, s1s2


Abdomen: soft, minimally distended, +ttp around incisions, band-aids c/d/i. 

Pedro with orange tinged urine, no bleeding noted. 


Ext: no edema, no calf ttp, scds in place and on.











A/P: 49 y/o F w/ PMHx hld Chronic Pelvic Pain/Leiomyomatous Uterus, now POD 1, s

/p Robotic Laparoscopic Total Hysterectomy,  Bilateral Salpingectomy. 








Pt noted to have elevated creatinine on post op labs (1.5), UOP 750ml overnight 

with additional 200ml this morning. On preop labs pts creatinine was 1.1 (2/25/ 19). AM labs with creatinine 1.1. 








-Renal u/s ordered for this AM due to elevated creatinine


-F/U Urology consult


-Continue pain control as ordered


-Advance diet as tolerated


-Monitor I&Os


-Bowel regimen


-DVT prophylaxis


-OOB ad amie


-Likely d/c this afternoon pending renal u/s 








d/w attending Dr Ward


Pt aware of plan per discussion w/ attending and agrees

## 2019-03-19 NOTE — DS
Physical Exam: 


SUBJECTIVE: Patient seen and examined








OBJECTIVE:





 Vital Signs











Temperature  98.4 F   03/19/19 09:25


 


Pulse Rate  77   03/19/19 09:25


 


Respiratory Rate  18   03/19/19 09:25


 


Blood Pressure  125/65   03/19/19 09:25


 


O2 Sat by Pulse Oximetry (%)  100   03/18/19 21:00








PHYSICAL EXAM





GENERAL: The patient is awake, alert, and fully oriented, in no acute distress.


HEAD: Normal with no signs of trauma.


EYES: PERRL, extraocular movements intact, sclera anicteric, conjunctiva clear. 


ENT: Ears normal, nares patent, oropharynx clear without exudates, moist mucous 

membranes.


NECK: Trachea midline, full range of motion, supple. 


LUNGS: Breath sounds equal, clear to auscultation bilaterally, no wheezes, no 

crackles, no accessory muscle use. 


HEART: Regular rate and rhythm, S1, S2 without murmur, rub or gallop.


ABDOMEN: Soft, nontender, nondistended, normoactive bowel sounds, no guarding, 

no rebound, no hepatosplenomegaly, no masses.


EXTREMITIES: 2+ pulses, warm, well-perfused, no edema. 


NEUROLOGICAL: Cranial nerves II through XII grossly intact. Normal speech, gait 

not observed.


PSYCH: Normal mood, normal affect.


SKIN: Warm, dry, normal turgor, no rashes or lesions noted.





LABS


CBC,CMP











WBC  12.5 K/mm3 (4.0-10.0)  H  03/19/19  07:00    


 


RBC  3.42 M/mm3 (3.60-5.2)  L  03/19/19  07:00    


 


Hgb  8.4 GM/dL (10.7-15.3)  L  03/19/19  07:00    


 


Hct  26.1 % (32.4-45.2)  L  03/19/19  07:00    


 


MCV  76.3 fl (80-96)  L  03/19/19  07:00    


 


MCH  24.5 pg (25.7-33.7)  L  03/19/19  07:00    


 


MCHC  32.2 g/dl (32.0-36.0)   03/19/19  07:00    


 


RDW  18.9 % (11.6-15.6)  H  03/19/19  07:00    


 


Plt Count  274 K/MM3 (134-434)   03/19/19  07:00    


 


MPV  8.5 fl (7.5-11.1)   03/19/19  07:00    


 


Sodium  138 mmol/L (136-145)   03/19/19  07:00    


 


Potassium  4.0 mmol/L (3.5-5.1)   03/19/19  07:00    


 


Chloride  107 mmol/L ()   03/19/19  07:00    


 


Carbon Dioxide  26 mmol/L (21-32)   03/19/19  07:00    


 


Anion Gap  4 MMOL/L (8-16)  L  03/19/19  07:00    


 


BUN  14 mg/dL (7-18)   03/19/19  07:00    


 


Creatinine  1.1 mg/dL (0.55-1.3)   03/19/19  07:00    


 


Creat Clearance w eGFR  53.24  (>60)   03/19/19  07:00    


 


Random Glucose  88 mg/dL ()   03/19/19  07:00    


 


Calcium  8.0 mg/dL (8.5-10.1)  L  03/19/19  07:00    











HOSPITAL COURSE:





Date of Admission:03/18/19





Date of Discharge: 03/19/19





The patient was admitted to the Med-Surg Unit after an elective repair of their 

(problem). Now, s/p ( procedure ).


The day of surgery, the patient ambulated the hallways with assistance. 

Narcotic and non-narcotic pain management control was achieved with an oral and 

IV approach. POD #1, the surgical drain was removed fully intact and without 

incident.


An xray was obtained and confirmed hardware placement at (level of ), no 

fractures or dislocations.


Marcia-operative IV ABX were administered. DVT prophylaxis was achieved with SCDs 

and early ambulation.


The patient ambulated with Physical Therapy and no services were recommended 

upon discharge. Narcotic scripts and or muscle relaxants were checked with [place] 

 prior to escibe. The discharge instructions and an oral pain management 

plan were reviewed with the patient. All questions answered. Above plan 

discussed with Dr. Iverson and agreed.

## 2019-03-19 NOTE — DS
Physical Exam: 


SUBJECTIVE: Patient seen and examined. Pt seen and examined this AM. Reports 

feeling well. Pain is controlled with pain meds. Tolerating clears, no flatus 

yet. Has not been oob. Pedro in place upon initial examination. Denies cp/sob, n

/v/d, calf pain/edema.











OBJECTIVE:





 Vital Signs











Temperature  98.1 F   03/19/19 05:55


 


Pulse Rate  82   03/19/19 05:55


 


Respiratory Rate  18   03/19/19 05:55


 


Blood Pressure  129/74   03/19/19 05:55


 


O2 Sat by Pulse Oximetry (%)  100   03/18/19 21:00








PHYSICAL EXAM











Gen: awake, alert, nad


Resp: unlabored on RA, cta b/l anteriorly


CV: rrr, s1s2


Abdomen: soft, minimally distended, +ttp around incisions, band-aids c/d/i. 

Pedro with orange tinged urine, no bleeding noted. 


Ext: no edema, no calf ttp, scds in place and on.








LABS


CBC,CMP











WBC  15.6 K/mm3 (4.0-10.0)  H  03/18/19  18:00    


 


RBC  3.93 M/mm3 (3.60-5.2)   03/18/19  18:00    


 


Hgb  9.7 GM/dL (10.7-15.3)  L  03/18/19  18:00    


 


Hct  30.5 % (32.4-45.2)  L  03/18/19  18:00    


 


MCV  77.8 fl (80-96)  L  03/18/19  18:00    


 


MCH  24.6 pg (25.7-33.7)  L  03/18/19  18:00    


 


MCHC  31.6 g/dl (32.0-36.0)  L  03/18/19  18:00    


 


RDW  18.6 % (11.6-15.6)  H  03/18/19  18:00    


 


Plt Count  301 K/MM3 (134-434)   03/18/19  18:00    


 


MPV  8.5 fl (7.5-11.1)   03/18/19  18:00    


 


Sodium  137 mmol/L (136-145)   03/18/19  18:00    


 


Potassium  4.1 mmol/L (3.5-5.1)   03/18/19  18:00    


 


Chloride  105 mmol/L ()   03/18/19  18:00    


 


Carbon Dioxide  21 mmol/L (21-32)   03/18/19  18:00    


 


Anion Gap  11 MMOL/L (8-16)   03/18/19  18:00    


 


BUN  14 mg/dL (7-18)   03/18/19  18:00    


 


Creatinine  1.5 mg/dL (0.55-1.3)  H  03/18/19  18:00    


 


Creat Clearance w eGFR  37.22  (>60)   03/18/19  18:00    


 


Random Glucose  209 mg/dL ()  H  03/18/19  18:00    


 


Calcium  7.7 mg/dL (8.5-10.1)  L  03/18/19  18:00    











HOSPITAL COURSE:





Date of Admission:03/18/19





Date of Discharge: 03/19/19








The patient was admitted to the Med-Surg Unit after an elective repair of their 

chronic pelvic pain and leiomyomatous uterus, Now, s/p Robotic Laparoscopic 

Total Hysterectomy,  Bilateral Salpingectomy. Pain control was achieved with 

narcotic and non-narcotic pain management control was achieved with an oral 

regimen. Marcia-operative IV ABX were administered. DVT prophylaxis was achieved 

with SCDs, Lovenox and early ambulation. Patient was noted to have an elevated 

creatinine of 1.5 on post op labs (preoperative testing revealed a creatinine 

of 1.1). Patient had adequate urine output overnight and morning labs revealed 

a creatinine of 1.1. Renal ultrasound was obtained which revealed marked left-

sided hydronephrosis consistent with long-standing obstruction. Urology consult 

was placed. CT scan was obtained per Urologists request to delineate patients 

anatomy. Patient is planned for outpatient Urology follow up with Dr Dutta. All narcotic were checked with [facility]  prior to escribe. The 

discharge instructions and an oral pain management plan were reviewed with the 

patient. All questions answered. Above plan discussed with Dr. Ward and 

agreed.





Minutes to complete discharge: 20





Visit type





- Case Type


Case Type: Scheduled





- Emergency


Emergency Visit: No





- New patient


This patient is new to me today: Yes


Date on this admission: 03/21/19





- Critical Care


Critical Care patient: No

## 2019-03-20 NOTE — PATH
Surgical Pathology Report



Patient Name:  SHANTELLE SAUNDERS

Accession #:  [id number]

Med. Rec. #:  [id number]                                                        

   /Age/Gender:  1970 (Age: 48) / F

Account:  [id number]                                                          

             Location: AMBULATORY SURG

Taken:  3/18/2019

Received:  3/18/2019

Reported:  3/20/2019

Physicians:  Barbara Ward M.D.

  



Specimen(s) Received

A: UTERUS AND CERVIX 

B: FALLOPIAN TUBE RIGHT 

C: FALLOPIAN TUBE LEFT 





Clinical History

Leiomyoma of uterus 







Final Diagnosis

A. UTERUS AND CERVIX, HYSTERECTOMY:

ADENOMYOSIS.

PROLIFERATIVE TYPE ENDOMETRIUM.

CERVIX WITH SQUAMOUS METAPLASIA. 



B. RIGHT FALLOPIAN TUBE, RESECTION:

PORTION OF FALLOPIAN TUBE WITH NO DIAGNOSTIC ABNORMALITIES.



C. LEFT FALLOPIAN TUBE, RESECTION:

PORTION OF FALLOPIAN TUBE WITH NO DIAGNOSTIC ABNORMALITIES.







***Electronically Signed***

Graham Olvera M.D.





Gross Description

A. Received in formalin labeled "uterus and cervix," is a 129 g uterus with an

attached cervix. No tubes or ovaries present. The specimen measures 8.4 cm from

superior to inferior, 4.5 cm from anterior to posterior, and 7.5 cm from left to

right. The serosa is tan-pink and smooth. The attached cervix measures 3 cm in

length and averages 3.1 cm in diameter. The ectocervix is tan-pink, smooth and

glistening. The endocervix is unremarkable. The endometrial cavity measures 4 cm

in length and averages 4 cm from cornu to cornu. The endometrium is tan-red and

measures up to 0.1 cm in thickness. The myometrium showing focI thickening

(2.1cm in greatest thickness) with nodular change. No distinct mass is

seenRepresentative sections are submitted in 9 cassettes as follows: 1-2:

anterior cervix; 3: posterior cervix 4-6: anterior thickened myometrium;

7-9-posterior endomyometrium. 



B.  Received fresh labelled "right fallopian tube" is a 3.5 cm long by 0.5 cm in

diameter portion of tissue consistent with a portion of fallopian tube with a

fimbriated end.  Sectioned and representative sections submitted in two

cassettes.



C.  Received fresh labelled "left fallopian tube" is a 2.5 cm long by 0.6 cm in

diameter portion of tissue consistent with a portion of fallopian tube with a

fimbriated end. Sectioned and representative sections submitted in two

cassettes.

_

BIBIANA/3/19/2019



daniella/3/19/2019